# Patient Record
Sex: MALE | Race: WHITE | Employment: OTHER | URBAN - METROPOLITAN AREA
[De-identification: names, ages, dates, MRNs, and addresses within clinical notes are randomized per-mention and may not be internally consistent; named-entity substitution may affect disease eponyms.]

---

## 2017-04-13 RX ORDER — VILAZODONE HYDROCHLORIDE 20 MG/1
TABLET ORAL
Qty: 30 TABLET | Refills: 2 | Status: SHIPPED | OUTPATIENT
Start: 2017-04-13 | End: 2017-07-05 | Stop reason: ALTCHOICE

## 2017-05-16 ENCOUNTER — TELEPHONE (OUTPATIENT)
Dept: FAMILY MEDICINE CLINIC | Age: 47
End: 2017-05-16

## 2017-07-03 RX ORDER — METOPROLOL SUCCINATE 50 MG/1
TABLET, EXTENDED RELEASE ORAL
Qty: 30 TABLET | Refills: 5 | Status: SHIPPED | OUTPATIENT
Start: 2017-07-03 | End: 2018-01-11 | Stop reason: SDUPTHER

## 2017-07-05 ENCOUNTER — OFFICE VISIT (OUTPATIENT)
Dept: FAMILY MEDICINE CLINIC | Age: 47
End: 2017-07-05

## 2017-07-05 VITALS
OXYGEN SATURATION: 97 % | HEIGHT: 73 IN | DIASTOLIC BLOOD PRESSURE: 88 MMHG | WEIGHT: 315 LBS | BODY MASS INDEX: 41.75 KG/M2 | SYSTOLIC BLOOD PRESSURE: 138 MMHG | HEART RATE: 73 BPM

## 2017-07-05 DIAGNOSIS — R12 PYROSIS: ICD-10-CM

## 2017-07-05 DIAGNOSIS — K21.9 GASTROESOPHAGEAL REFLUX DISEASE, ESOPHAGITIS PRESENCE NOT SPECIFIED: Primary | ICD-10-CM

## 2017-07-05 DIAGNOSIS — E66.01 MORBID OBESITY WITH BMI OF 40.0-44.9, ADULT (HCC): ICD-10-CM

## 2017-07-05 PROCEDURE — 99214 OFFICE O/P EST MOD 30 MIN: CPT | Performed by: FAMILY MEDICINE

## 2017-07-05 RX ORDER — SUCRALFATE 1 G/1
1 TABLET ORAL 4 TIMES DAILY
Qty: 120 TABLET | Refills: 3 | Status: SHIPPED | OUTPATIENT
Start: 2017-07-05 | End: 2018-02-06 | Stop reason: SDUPTHER

## 2017-07-05 ASSESSMENT — PATIENT HEALTH QUESTIONNAIRE - PHQ9
2. FEELING DOWN, DEPRESSED OR HOPELESS: 0
1. LITTLE INTEREST OR PLEASURE IN DOING THINGS: 0
SUM OF ALL RESPONSES TO PHQ QUESTIONS 1-9: 0
SUM OF ALL RESPONSES TO PHQ9 QUESTIONS 1 & 2: 0

## 2017-07-05 ASSESSMENT — ENCOUNTER SYMPTOMS: ABDOMINAL PAIN: 1

## 2017-07-06 ASSESSMENT — ENCOUNTER SYMPTOMS
FLATUS: 0
CONSTIPATION: 0
NAUSEA: 0
HEMATOCHEZIA: 0
BELCHING: 0
DIARRHEA: 0
VOMITING: 0

## 2017-09-19 ENCOUNTER — OFFICE VISIT (OUTPATIENT)
Dept: FAMILY MEDICINE CLINIC | Age: 47
End: 2017-09-19

## 2017-09-19 VITALS
BODY MASS INDEX: 39.98 KG/M2 | TEMPERATURE: 98.1 F | WEIGHT: 303 LBS | HEART RATE: 73 BPM | OXYGEN SATURATION: 97 % | DIASTOLIC BLOOD PRESSURE: 80 MMHG | SYSTOLIC BLOOD PRESSURE: 122 MMHG

## 2017-09-19 DIAGNOSIS — J02.9 PHARYNGITIS, UNSPECIFIED ETIOLOGY: Primary | ICD-10-CM

## 2017-09-19 DIAGNOSIS — Z23 NEED FOR INFLUENZA VACCINATION: ICD-10-CM

## 2017-09-19 PROCEDURE — 90471 IMMUNIZATION ADMIN: CPT | Performed by: FAMILY MEDICINE

## 2017-09-19 PROCEDURE — 90688 IIV4 VACCINE SPLT 0.5 ML IM: CPT | Performed by: FAMILY MEDICINE

## 2017-09-19 PROCEDURE — 99214 OFFICE O/P EST MOD 30 MIN: CPT | Performed by: FAMILY MEDICINE

## 2017-09-19 RX ORDER — AMOXICILLIN 875 MG/1
875 TABLET, COATED ORAL 2 TIMES DAILY
Qty: 20 TABLET | Refills: 0 | Status: SHIPPED | OUTPATIENT
Start: 2017-09-19 | End: 2017-09-29

## 2017-09-19 ASSESSMENT — ENCOUNTER SYMPTOMS
SWOLLEN GLANDS: 1
ABDOMINAL PAIN: 0
COUGH: 1
VOMITING: 0
SORE THROAT: 1
CHANGE IN BOWEL HABIT: 0
NAUSEA: 0

## 2017-10-10 RX ORDER — ESOMEPRAZOLE MAGNESIUM 40 MG/1
CAPSULE, DELAYED RELEASE ORAL
Qty: 30 CAPSULE | Refills: 2 | Status: SHIPPED | OUTPATIENT
Start: 2017-10-10 | End: 2018-01-11 | Stop reason: SDUPTHER

## 2018-01-11 RX ORDER — METOPROLOL SUCCINATE 50 MG/1
TABLET, EXTENDED RELEASE ORAL
Qty: 30 TABLET | Refills: 0 | Status: SHIPPED | OUTPATIENT
Start: 2018-01-11 | End: 2018-02-06 | Stop reason: SDUPTHER

## 2018-01-11 RX ORDER — ESOMEPRAZOLE MAGNESIUM 40 MG/1
CAPSULE, DELAYED RELEASE ORAL
Qty: 30 CAPSULE | Refills: 0 | Status: SHIPPED | OUTPATIENT
Start: 2018-01-11 | End: 2018-02-06

## 2018-02-06 ENCOUNTER — OFFICE VISIT (OUTPATIENT)
Dept: FAMILY MEDICINE CLINIC | Age: 48
End: 2018-02-06

## 2018-02-06 VITALS
SYSTOLIC BLOOD PRESSURE: 140 MMHG | BODY MASS INDEX: 41.08 KG/M2 | HEART RATE: 72 BPM | HEIGHT: 73 IN | DIASTOLIC BLOOD PRESSURE: 92 MMHG | OXYGEN SATURATION: 97 % | WEIGHT: 310 LBS

## 2018-02-06 DIAGNOSIS — K21.9 GASTROESOPHAGEAL REFLUX DISEASE, ESOPHAGITIS PRESENCE NOT SPECIFIED: ICD-10-CM

## 2018-02-06 DIAGNOSIS — I10 ESSENTIAL HYPERTENSION: Primary | ICD-10-CM

## 2018-02-06 PROCEDURE — 99213 OFFICE O/P EST LOW 20 MIN: CPT | Performed by: FAMILY MEDICINE

## 2018-02-06 RX ORDER — SUCRALFATE 1 G/1
1 TABLET ORAL 4 TIMES DAILY
Qty: 120 TABLET | Refills: 11 | Status: SHIPPED | OUTPATIENT
Start: 2018-02-06 | End: 2022-09-16 | Stop reason: SDUPTHER

## 2018-02-06 RX ORDER — METOPROLOL SUCCINATE 100 MG/1
100 TABLET, EXTENDED RELEASE ORAL DAILY
Qty: 30 TABLET | Refills: 11 | Status: SHIPPED | OUTPATIENT
Start: 2018-02-06 | End: 2019-02-08 | Stop reason: SDUPTHER

## 2018-02-06 RX ORDER — PANTOPRAZOLE SODIUM 40 MG/1
40 TABLET, DELAYED RELEASE ORAL DAILY
Qty: 30 TABLET | Refills: 11 | Status: SHIPPED | OUTPATIENT
Start: 2018-02-06 | End: 2019-02-07 | Stop reason: SDUPTHER

## 2018-02-06 ASSESSMENT — ENCOUNTER SYMPTOMS
CONSTIPATION: 0
ANAL BLEEDING: 0
DIARRHEA: 0
CHEST TIGHTNESS: 0
EYE DISCHARGE: 0
ABDOMINAL PAIN: 0
SHORTNESS OF BREATH: 0
BLOOD IN STOOL: 0
COUGH: 0
ABDOMINAL DISTENTION: 0

## 2018-06-13 ENCOUNTER — OFFICE VISIT (OUTPATIENT)
Dept: FAMILY MEDICINE CLINIC | Age: 48
End: 2018-06-13

## 2018-06-13 VITALS
HEART RATE: 70 BPM | SYSTOLIC BLOOD PRESSURE: 138 MMHG | OXYGEN SATURATION: 97 % | WEIGHT: 301 LBS | BODY MASS INDEX: 39.71 KG/M2 | DIASTOLIC BLOOD PRESSURE: 84 MMHG

## 2018-06-13 DIAGNOSIS — K21.9 GASTROESOPHAGEAL REFLUX DISEASE, ESOPHAGITIS PRESENCE NOT SPECIFIED: ICD-10-CM

## 2018-06-13 DIAGNOSIS — E78.5 DYSLIPIDEMIA: ICD-10-CM

## 2018-06-13 DIAGNOSIS — I10 ESSENTIAL HYPERTENSION: Primary | ICD-10-CM

## 2018-06-13 DIAGNOSIS — N64.4 NIPPLE PAIN: ICD-10-CM

## 2018-06-13 LAB
A/G RATIO: 1.5 (ref 1.1–2.2)
ALBUMIN SERPL-MCNC: 4.3 G/DL (ref 3.4–5)
ALP BLD-CCNC: 102 U/L (ref 40–129)
ALT SERPL-CCNC: 29 U/L (ref 10–40)
ANION GAP SERPL CALCULATED.3IONS-SCNC: 16 MMOL/L (ref 3–16)
AST SERPL-CCNC: 23 U/L (ref 15–37)
BILIRUB SERPL-MCNC: 0.7 MG/DL (ref 0–1)
BUN BLDV-MCNC: 12 MG/DL (ref 7–20)
CALCIUM SERPL-MCNC: 9.3 MG/DL (ref 8.3–10.6)
CHLORIDE BLD-SCNC: 102 MMOL/L (ref 99–110)
CHOLESTEROL, TOTAL: 212 MG/DL (ref 0–199)
CO2: 23 MMOL/L (ref 21–32)
CREAT SERPL-MCNC: 1.1 MG/DL (ref 0.9–1.3)
GFR AFRICAN AMERICAN: >60
GFR NON-AFRICAN AMERICAN: >60
GLOBULIN: 2.9 G/DL
GLUCOSE BLD-MCNC: 104 MG/DL (ref 70–99)
HDLC SERPL-MCNC: 28 MG/DL (ref 40–60)
LDL CHOLESTEROL CALCULATED: 144 MG/DL
POTASSIUM SERPL-SCNC: 4.5 MMOL/L (ref 3.5–5.1)
SODIUM BLD-SCNC: 141 MMOL/L (ref 136–145)
TOTAL PROTEIN: 7.2 G/DL (ref 6.4–8.2)
TRIGL SERPL-MCNC: 199 MG/DL (ref 0–150)
VLDLC SERPL CALC-MCNC: 40 MG/DL

## 2018-06-13 PROCEDURE — 99214 OFFICE O/P EST MOD 30 MIN: CPT | Performed by: FAMILY MEDICINE

## 2018-06-13 PROCEDURE — 36415 COLL VENOUS BLD VENIPUNCTURE: CPT | Performed by: FAMILY MEDICINE

## 2018-06-13 ASSESSMENT — ENCOUNTER SYMPTOMS
ABDOMINAL PAIN: 0
SHORTNESS OF BREATH: 0

## 2018-06-19 ENCOUNTER — HOSPITAL ENCOUNTER (OUTPATIENT)
Dept: MAMMOGRAPHY | Age: 48
Discharge: HOME | End: 2018-06-19
Admitting: FAMILY MEDICINE

## 2018-06-19 DIAGNOSIS — N62 GYNECOMASTIA, MALE: Primary | ICD-10-CM

## 2018-06-19 DIAGNOSIS — N64.4 MASTODYNIA: ICD-10-CM

## 2018-06-22 ENCOUNTER — NURSE ONLY (OUTPATIENT)
Dept: FAMILY MEDICINE CLINIC | Age: 48
End: 2018-06-22

## 2018-06-22 DIAGNOSIS — N62 GYNECOMASTIA, MALE: ICD-10-CM

## 2018-06-22 DIAGNOSIS — N62 GYNECOMASTIA, MALE: Primary | ICD-10-CM

## 2018-06-22 LAB
ESTRADIOL LEVEL: 30 PG/ML
LUTEINIZING HORMONE: 5.9 MIU/ML
TSH REFLEX: 2.44 UIU/ML (ref 0.27–4.2)

## 2018-06-22 PROCEDURE — 36415 COLL VENOUS BLD VENIPUNCTURE: CPT | Performed by: FAMILY MEDICINE

## 2018-06-24 LAB — HCG TUMOR MARKER: <1 IU/L (ref 0–3)

## 2018-06-26 LAB
SEX HORMONE BINDING GLOBULIN: 38 NMOL/L (ref 11–80)
TESTOSTERONE FREE-NONMALE: 64.4 PG/ML (ref 47–244)
TESTOSTERONE TOTAL: 351 NG/DL (ref 220–1000)

## 2018-08-03 ENCOUNTER — OFFICE VISIT (OUTPATIENT)
Dept: FAMILY MEDICINE CLINIC | Age: 48
End: 2018-08-03

## 2018-08-03 VITALS
DIASTOLIC BLOOD PRESSURE: 72 MMHG | SYSTOLIC BLOOD PRESSURE: 128 MMHG | WEIGHT: 300.4 LBS | HEIGHT: 73 IN | BODY MASS INDEX: 39.81 KG/M2 | OXYGEN SATURATION: 97 % | HEART RATE: 98 BPM

## 2018-08-03 DIAGNOSIS — F41.9 ANXIETY: Primary | ICD-10-CM

## 2018-08-03 PROCEDURE — 99213 OFFICE O/P EST LOW 20 MIN: CPT | Performed by: PHYSICIAN ASSISTANT

## 2018-08-03 RX ORDER — HYDROXYZINE PAMOATE 50 MG/1
50-100 CAPSULE ORAL 4 TIMES DAILY PRN
Qty: 20 CAPSULE | Refills: 0 | Status: SHIPPED | OUTPATIENT
Start: 2018-08-03 | End: 2018-08-06 | Stop reason: SDUPTHER

## 2018-08-03 ASSESSMENT — ENCOUNTER SYMPTOMS: RESPIRATORY NEGATIVE: 1

## 2018-08-03 NOTE — PROGRESS NOTES
Subjective:      Patient ID: Carmencita Shone is a 56 y.o. male. HPI  Patient presents today with anxiety concerns. He has had anxiety in the past but had been well controlled  but something happened a few days ago that patient will not discuss today that is overwhelming to him. Denies any SI. Review of Systems   Constitutional: Negative. Respiratory: Negative. Cardiovascular: Negative. Psychiatric/Behavioral: Positive for decreased concentration. Negative for sleep disturbance. The patient is nervous/anxious. Objective:   Physical Exam   Constitutional: He is oriented to person, place, and time. He appears well-developed and well-nourished. Neurological: He is alert and oriented to person, place, and time. Skin: Skin is warm and dry. Psychiatric: He has a normal mood and affect. Assessment:       Diagnosis Orders   1. Anxiety             Plan: Will rx a temporary supply of vistaril which he had been on in the past until he can follow up with his primary provider early next week.

## 2018-08-06 ENCOUNTER — TELEPHONE (OUTPATIENT)
Dept: ADMINISTRATIVE | Age: 48
End: 2018-08-06

## 2018-08-06 RX ORDER — HYDROXYZINE PAMOATE 50 MG/1
50-100 CAPSULE ORAL 4 TIMES DAILY PRN
Qty: 120 CAPSULE | Refills: 0 | Status: SHIPPED | OUTPATIENT
Start: 2018-08-06 | End: 2020-03-03

## 2018-08-16 ENCOUNTER — OFFICE VISIT (OUTPATIENT)
Dept: FAMILY MEDICINE CLINIC | Age: 48
End: 2018-08-16

## 2018-08-16 VITALS
OXYGEN SATURATION: 97 % | BODY MASS INDEX: 39.84 KG/M2 | WEIGHT: 302 LBS | HEART RATE: 60 BPM | DIASTOLIC BLOOD PRESSURE: 86 MMHG | SYSTOLIC BLOOD PRESSURE: 124 MMHG

## 2018-08-16 DIAGNOSIS — R06.83 SNORING: ICD-10-CM

## 2018-08-16 DIAGNOSIS — F41.9 ANXIETY: Primary | ICD-10-CM

## 2018-08-16 PROCEDURE — 99214 OFFICE O/P EST MOD 30 MIN: CPT | Performed by: FAMILY MEDICINE

## 2018-08-16 RX ORDER — PAROXETINE 10 MG/1
10 TABLET, FILM COATED ORAL DAILY
Qty: 30 TABLET | Refills: 3 | Status: SHIPPED | OUTPATIENT
Start: 2018-08-16 | End: 2018-09-13

## 2018-08-16 ASSESSMENT — ENCOUNTER SYMPTOMS: SHORTNESS OF BREATH: 0

## 2018-08-16 NOTE — PATIENT INSTRUCTIONS
(sometimes called L-tryptophan), warfarin (Coumadin, Jantoven);  · heart rhythm medicine;  · HIV or AIDS medications;  · certain medicines to treat narcolepsy or ADHD --amphetamine, atomoxetine, dextroamphetamine, Adderall, Dexedrine, Evekeo, Vyvanse, and others;  · narcotic pain medicine --fentanyl, tramadol;  · medicine to treat anxiety, mood disorders, thought disorders, or mental illness --such as buspirone, lithium, other antidepressants, or antipsychotics;  · migraine headache medicine --sumatriptan, rizatriptan, zolmitriptan, and others; or  · seizure medicine --phenobarbital, phenytoin. This list is not complete. Other drugs may interact with paroxetine, including prescription and over-the-counter medicines, vitamins, and herbal products. Not all possible interactions are listed in this medication guide. Where can I get more information? Your pharmacist can provide more information about paroxetine. Remember, keep this and all other medicines out of the reach of children, never share your medicines with others, and use this medication only for the indication prescribed. Every effort has been made to ensure that the information provided by [facility]  is accurate, up-to-date, and complete, but no guarantee is made to that effect. Drug information contained herein may be time sensitive. [facility] information has been compiled for use by healthcare practitioners and consumers in the [place] and therefore [employer] does not warrant that uses outside of the [place] are appropriate, unless specifically indicated otherwise. [facility]'s drug information does not endorse drugs, diagnose patients or recommend therapy.  [facility] drug information is an informational resource designed to assist licensed healthcare practitioners in caring for their patients and/or to serve consumers viewing this service as a supplement to, and not a substitute for, the expertise, skill, knowledge and judgment of healthcare practitioners. The absence of a warning for a given drug or drug combination in no way should be construed to indicate that the drug or drug combination is safe, effective or appropriate for any given patient. [facility] does not assume any responsibility for any aspect of healthcare administered with the aid of information [facility] provides. The information contained herein is not intended to cover all possible uses, directions, precautions, warnings, drug interactions, allergic reactions, or adverse effects. If you have questions about the drugs you are taking, check with your doctor, nurse or pharmacist.  Copyright 3656-9702 [address]: 26.01. Revision date: 5/8/2017. Care instructions adapted under license by [facility] ([facility]). If you have questions about a medical condition or this instruction, always ask your healthcare professional. [place] [address] any warranty or liability for your use of this information.

## 2018-08-16 NOTE — PROGRESS NOTES
no wheezes. Neurological: He is alert and oriented to person, place, and time. Skin: Skin is warm and dry. He is not diaphoretic. Vitals reviewed. ASSESSMENT:   Well Adult, See encounter diagnoses  Earl Kearney was seen today for medication check and sleep apnea. Diagnoses and all orders for this visit:    Anxiety  Will start paxil and see if it better controls his overall mood. Can still take vistaril prn if needed. Medication handout provided notify me of any SE.  -     PARoxetine (PAXIL) 10 MG tablet; Take 1 tablet by mouth daily    Snoring  -     [address]:   See orders and medications filed with this encounter. Return in about 4 weeks (around 9/13/2018) for for follow up of anxiety.

## 2018-08-17 ENCOUNTER — TELEPHONE (OUTPATIENT)
Dept: FAMILY MEDICINE CLINIC | Age: 48
End: 2018-08-17

## 2018-08-21 ENCOUNTER — OFFICE VISIT (OUTPATIENT)
Dept: PULMONOLOGY | Age: 48
End: 2018-08-21

## 2018-08-21 VITALS
SYSTOLIC BLOOD PRESSURE: 131 MMHG | BODY MASS INDEX: 39.63 KG/M2 | RESPIRATION RATE: 16 BRPM | HEART RATE: 52 BPM | OXYGEN SATURATION: 97 % | HEIGHT: 73 IN | DIASTOLIC BLOOD PRESSURE: 81 MMHG | WEIGHT: 299 LBS | TEMPERATURE: 97 F

## 2018-08-21 DIAGNOSIS — R06.83 SNORING: Primary | ICD-10-CM

## 2018-08-21 DIAGNOSIS — E66.9 OBESITY (BMI 30-39.9): ICD-10-CM

## 2018-08-21 DIAGNOSIS — I10 ESSENTIAL HYPERTENSION: ICD-10-CM

## 2018-08-21 DIAGNOSIS — G47.30 OBSERVED SLEEP APNEA: ICD-10-CM

## 2018-08-21 PROCEDURE — 99244 OFF/OP CNSLTJ NEW/EST MOD 40: CPT | Performed by: NURSE PRACTITIONER

## 2018-08-21 ASSESSMENT — SLEEP AND FATIGUE QUESTIONNAIRES
NECK CIRCUMFERENCE (INCHES): 19.5
HOW LIKELY ARE YOU TO NOD OFF OR FALL ASLEEP WHILE SITTING AND TALKING TO SOMEONE: 0
HOW LIKELY ARE YOU TO NOD OFF OR FALL ASLEEP WHILE SITTING AND READING: 1
HOW LIKELY ARE YOU TO NOD OFF OR FALL ASLEEP IN A CAR, WHILE STOPPED FOR A FEW MINUTES IN TRAFFIC: 0
ESS TOTAL SCORE: 5
HOW LIKELY ARE YOU TO NOD OFF OR FALL ASLEEP WHILE LYING DOWN TO REST IN THE AFTERNOON WHEN CIRCUMSTANCES PERMIT: 3
HOW LIKELY ARE YOU TO NOD OFF OR FALL ASLEEP WHEN YOU ARE A PASSENGER IN A CAR FOR AN HOUR WITHOUT A BREAK: 0
HOW LIKELY ARE YOU TO NOD OFF OR FALL ASLEEP WHILE WATCHING TV: 1
HOW LIKELY ARE YOU TO NOD OFF OR FALL ASLEEP WHILE SITTING INACTIVE IN A PUBLIC PLACE: 0
HOW LIKELY ARE YOU TO NOD OFF OR FALL ASLEEP WHILE SITTING QUIETLY AFTER LUNCH WITHOUT ALCOHOL: 0

## 2018-08-21 NOTE — PROGRESS NOTES
Patient ID: Aleksandr Bernabe is a 56 y.o. male who is being seen today for   Chief Complaint   Patient presents with   Hyun Horton Patient     R/B Dr Marco Hernandez,  apnea low sat         HPI:   Aleksandr Bernabe is a 56 y.o. male in office for BERNADINE/snoring evaluation. States he had a colonoscopy and was told he was hypoxic. Patient reports snoring at night for the past 20+ years. Has witnessed apnea. Has woke up at night choking and gasping for air, states has reflux. Sometimes restorative sleep. No dry mouth upon awakening. Fatigue and tiredness during the day. No history of sleep apnea. Bedtime 8 pm-4 am and rise time is 10 am- noon. It takes few minutes to fall asleep. 0-1 nocturia. Wakes up 0-5 times at night. It takes few minutes to fall back a sleep. 1 nap during the day few time a week (120 minutes). No headache in am. No car wrecks or near wrecks because of the sleepiness. No nodding off while driving. No weight gain. No forgetfulness or decreased concentration. No nasal congestion at night. Drinks 3--4 caffinated beverages per day. No significant alcohol. No restless feelings in legs at night. No teeth grinding. No nightmares. No sleep walking. No night time panic attacks. No narcotics. No drug abuse. No history of depression. +history of anxiety. No history of atrial fibrillation. No history of DM. +history of HTN. No history of ischemic heart disease. No history of stroke. ESS is 5. No smoking. No FH for BERNADINE, RLS or narcolepsy.      Sleep Medicine 8/21/2018   Sitting and reading 1   Watching TV 1   Sitting, inactive in a public place (e.g. a theatre or a meeting) 0   As a passenger in a car for an hour without a break 0   Lying down to rest in the afternoon when circumstances permit 3   Sitting and talking to someone 0   Sitting quietly after a lunch without alcohol 0   In a car, while stopped for a few minutes in traffic 0   Total score 5   Neck circumference 19.5       Past Medical History:  Past Medical History: rash  Neurological: Negative for syncope  Hematological: Negative for adenopathy  Psychiatric/Behavorial: Negative for anxiety      Objective:   PHYSICAL EXAM:    /81 (Site: Left Arm, Position: Sitting)   Pulse 52   Temp 97 °F (36.1 °C) (Oral)   Resp 16   Ht 6' 1\" (1.854 m)   Wt 299 lb (135.6 kg)   SpO2 97%   BMI 39.45 kg/m²     Physical Exam  Gen: No acute distress. Obese. BMI of 39.45  Eyes: PERRL. No sclera icterus. No conjunctival injection. ENT: No discharge. Pharynx clear. Mallampati class IV. Neck: Trachea midline. No obvious mass. Neck circumference 19.5\"  Resp: No accessory muscle use. No crackles. No wheezes. No rhonchi. CV: Regular rate. Regular rhythm. No murmur or rub. No LE edema. GI: Non-tender. Non-distended. Skin: Warm and dry. No nodule on exposed extremities. Lymph: No cervical LAD. No supraclavicular LAD. M/S: No cyanosis. No obvious joint deformity. Neuro: Awake. Alert. Moves all four extremities. Psych: Oriented x 3. No anxiety. DATA:       Assessment:       · Snoring  · Observed sleep apnea   · Obesity  · HTN, GERD, anxiety per PCP        Plan:       · HST to evaluate for sleep related breathing disorder. · Treatment options were discussed with patient if PSG reveals BERNADINE, including CPAP/APAP therapy, oral appliances and upper airway surgery. · Sleep hygiene  · Avoid sedatives, alcohol and caffeinated drinks at bed time. · No driving motorized vehicles or operating heavy machinery while fatigue, drowsy or sleepy. · Weight loss is also recommended as a long-term intervention. · Complications of BERNADINE if not treated were discussed with patient patient to include systemic hypertension, pulmonary hypertension, cardiovascular morbidities, car accidents and all cause mortality. Discussed pathophysiology of BERNADINE with patient today- video shown in office.   Patient education handout provided regarding sleep tips and CPAP cleaning recommendations

## 2018-09-13 ENCOUNTER — OFFICE VISIT (OUTPATIENT)
Dept: FAMILY MEDICINE CLINIC | Age: 48
End: 2018-09-13

## 2018-09-13 VITALS
WEIGHT: 296.4 LBS | HEIGHT: 73 IN | DIASTOLIC BLOOD PRESSURE: 82 MMHG | HEART RATE: 65 BPM | BODY MASS INDEX: 39.28 KG/M2 | SYSTOLIC BLOOD PRESSURE: 120 MMHG | OXYGEN SATURATION: 97 %

## 2018-09-13 DIAGNOSIS — Z13.1 DIABETES MELLITUS SCREENING: ICD-10-CM

## 2018-09-13 DIAGNOSIS — Z23 NEED FOR INFLUENZA VACCINATION: ICD-10-CM

## 2018-09-13 DIAGNOSIS — F41.9 ANXIETY: Primary | ICD-10-CM

## 2018-09-13 PROCEDURE — 99213 OFFICE O/P EST LOW 20 MIN: CPT | Performed by: FAMILY MEDICINE

## 2018-09-13 PROCEDURE — 90471 IMMUNIZATION ADMIN: CPT | Performed by: FAMILY MEDICINE

## 2018-09-13 PROCEDURE — 36415 COLL VENOUS BLD VENIPUNCTURE: CPT | Performed by: FAMILY MEDICINE

## 2018-09-13 PROCEDURE — 90688 IIV4 VACCINE SPLT 0.5 ML IM: CPT | Performed by: FAMILY MEDICINE

## 2018-09-13 ASSESSMENT — PATIENT HEALTH QUESTIONNAIRE - PHQ9
SUM OF ALL RESPONSES TO PHQ QUESTIONS 1-9: 0
SUM OF ALL RESPONSES TO PHQ9 QUESTIONS 1 & 2: 0
2. FEELING DOWN, DEPRESSED OR HOPELESS: 0
SUM OF ALL RESPONSES TO PHQ QUESTIONS 1-9: 0
1. LITTLE INTEREST OR PLEASURE IN DOING THINGS: 0

## 2018-09-14 LAB
ESTIMATED AVERAGE GLUCOSE: 116.9 MG/DL
HBA1C MFR BLD: 5.7 %

## 2018-09-18 ENCOUNTER — OFFICE VISIT (OUTPATIENT)
Dept: DERMATOLOGY | Age: 48
End: 2018-09-18

## 2018-09-18 DIAGNOSIS — D22.9 MULTIPLE NEVI: Primary | ICD-10-CM

## 2018-09-18 DIAGNOSIS — L91.8 INFLAMED SKIN TAG: ICD-10-CM

## 2018-09-18 DIAGNOSIS — L82.1 SEBORRHEIC KERATOSIS: ICD-10-CM

## 2018-09-18 PROCEDURE — 99202 OFFICE O/P NEW SF 15 MIN: CPT | Performed by: DERMATOLOGY

## 2018-09-18 PROCEDURE — 11200 RMVL SKIN TAGS UP TO&INC 15: CPT | Performed by: DERMATOLOGY
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
## 2018-09-19 ENCOUNTER — HOSPITAL ENCOUNTER (OUTPATIENT)
Dept: SLEEP CENTER | Age: 48
Discharge: HOME | End: 2018-09-21
Payer: COMMERCIAL

## 2018-09-19 DIAGNOSIS — G47.30 OBSERVED SLEEP APNEA: ICD-10-CM

## 2018-09-19 DIAGNOSIS — I10 ESSENTIAL HYPERTENSION: ICD-10-CM

## 2018-09-19 DIAGNOSIS — R06.83 SNORING: ICD-10-CM

## 2018-09-19 PROCEDURE — 95806 SLEEP STUDY UNATT&RESP EFFT: CPT

## 2018-09-25 ENCOUNTER — TELEPHONE (OUTPATIENT)
Dept: PULMONOLOGY | Age: 48
End: 2018-09-25

## 2018-09-25 DIAGNOSIS — G47.33 OSA (OBSTRUCTIVE SLEEP APNEA): Primary | ICD-10-CM

## 2018-10-04 DIAGNOSIS — G47.33 OSA (OBSTRUCTIVE SLEEP APNEA): Primary | ICD-10-CM

## 2018-10-26 ENCOUNTER — HOSPITAL ENCOUNTER (OUTPATIENT)
Dept: SLEEP CENTER | Age: 48
Discharge: HOME | End: 2018-10-28
Payer: COMMERCIAL

## 2018-10-26 DIAGNOSIS — G47.33 OSA (OBSTRUCTIVE SLEEP APNEA): ICD-10-CM

## 2018-10-26 PROCEDURE — 95811 POLYSOM 6/>YRS CPAP 4/> PARM: CPT

## 2018-11-02 DIAGNOSIS — G47.33 OSA (OBSTRUCTIVE SLEEP APNEA): Primary | ICD-10-CM

## 2018-12-06 ENCOUNTER — OFFICE VISIT (OUTPATIENT)
Dept: FAMILY MEDICINE CLINIC | Age: 48
End: 2018-12-06
Payer: COMMERCIAL

## 2018-12-06 VITALS
BODY MASS INDEX: 40.5 KG/M2 | DIASTOLIC BLOOD PRESSURE: 70 MMHG | HEART RATE: 63 BPM | WEIGHT: 307 LBS | OXYGEN SATURATION: 98 % | SYSTOLIC BLOOD PRESSURE: 112 MMHG

## 2018-12-06 DIAGNOSIS — I10 ESSENTIAL HYPERTENSION: ICD-10-CM

## 2018-12-06 DIAGNOSIS — F32.A DEPRESSION, UNSPECIFIED DEPRESSION TYPE: Primary | ICD-10-CM

## 2018-12-06 PROCEDURE — 99213 OFFICE O/P EST LOW 20 MIN: CPT | Performed by: FAMILY MEDICINE

## 2018-12-06 ASSESSMENT — ENCOUNTER SYMPTOMS: SHORTNESS OF BREATH: 0
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
## 2018-12-20 ENCOUNTER — TELEPHONE (OUTPATIENT)
Dept: PULMONOLOGY | Age: 48
End: 2018-12-20

## 2018-12-20 DIAGNOSIS — G47.33 OSA (OBSTRUCTIVE SLEEP APNEA): Primary | ICD-10-CM

## 2018-12-20 NOTE — TELEPHONE ENCOUNTER
BiPAP compliance report from 11/19/18-12/11/18 on BiPAP 15/10 cm H2O reviewed. Compliance is good 96%. AHI is much improved but very slightly elevated 5.6. Report also showing leak 28.2 L/m.   Please send order for mask fitting at [facility] as mask leak could be contributing to elevated AHI

## 2019-01-29 ENCOUNTER — OFFICE VISIT (OUTPATIENT)
Dept: PULMONOLOGY | Age: 49
End: 2019-01-29
Payer: COMMERCIAL

## 2019-01-29 VITALS
OXYGEN SATURATION: 97 % | WEIGHT: 315 LBS | TEMPERATURE: 97 F | RESPIRATION RATE: 16 BRPM | HEIGHT: 73 IN | DIASTOLIC BLOOD PRESSURE: 67 MMHG | HEART RATE: 51 BPM | BODY MASS INDEX: 41.75 KG/M2 | SYSTOLIC BLOOD PRESSURE: 121 MMHG

## 2019-01-29 DIAGNOSIS — G47.33 SEVERE OBSTRUCTIVE SLEEP APNEA: Primary | ICD-10-CM

## 2019-01-29 DIAGNOSIS — Z71.89 ENCOUNTER FOR BIPAP USE COUNSELING: ICD-10-CM

## 2019-01-29 DIAGNOSIS — E66.01 OBESITY, MORBID, BMI 40.0-49.9 (HCC): ICD-10-CM

## 2019-01-29 DIAGNOSIS — I10 ESSENTIAL HYPERTENSION: ICD-10-CM

## 2019-01-29 PROCEDURE — 99213 OFFICE O/P EST LOW 20 MIN: CPT | Performed by: NURSE PRACTITIONER

## 2019-01-29 ASSESSMENT — SLEEP AND FATIGUE QUESTIONNAIRES
HOW LIKELY ARE YOU TO NOD OFF OR FALL ASLEEP WHILE SITTING AND TALKING TO SOMEONE: 0
ESS TOTAL SCORE: 0
HOW LIKELY ARE YOU TO NOD OFF OR FALL ASLEEP WHILE WATCHING TV: 0
HOW LIKELY ARE YOU TO NOD OFF OR FALL ASLEEP WHEN YOU ARE A PASSENGER IN A CAR FOR AN HOUR WITHOUT A BREAK: 0
NECK CIRCUMFERENCE (INCHES): 19.5
HOW LIKELY ARE YOU TO NOD OFF OR FALL ASLEEP WHILE SITTING QUIETLY AFTER LUNCH WITHOUT ALCOHOL: 0
HOW LIKELY ARE YOU TO NOD OFF OR FALL ASLEEP WHILE SITTING AND READING: 0
HOW LIKELY ARE YOU TO NOD OFF OR FALL ASLEEP WHILE SITTING INACTIVE IN A PUBLIC PLACE: 0
HOW LIKELY ARE YOU TO NOD OFF OR FALL ASLEEP IN A CAR, WHILE STOPPED FOR A FEW MINUTES IN TRAFFIC: 0
HOW LIKELY ARE YOU TO NOD OFF OR FALL ASLEEP WHILE LYING DOWN TO REST IN THE AFTERNOON WHEN CIRCUMSTANCES PERMIT: 0

## 2019-02-07 RX ORDER — PANTOPRAZOLE SODIUM 40 MG/1
40 TABLET, DELAYED RELEASE ORAL DAILY
Qty: 30 TABLET | Refills: 11 | Status: SHIPPED | OUTPATIENT
Start: 2019-02-07 | End: 2020-03-03

## 2019-02-08 RX ORDER — METOPROLOL SUCCINATE 100 MG/1
100 TABLET, EXTENDED RELEASE ORAL DAILY
Qty: 30 TABLET | Refills: 11 | Status: SHIPPED | OUTPATIENT
Start: 2019-02-08 | End: 2020-03-03 | Stop reason: SDUPTHER

## 2019-07-01 NOTE — TELEPHONE ENCOUNTER
LOV: 12/6/18  F/U: Not scheduled       Plan:   See orders and medications filed with this encounter. Return in about 6 months (around 6/6/2019). for fasting labs

## 2020-01-28 ENCOUNTER — OFFICE VISIT (OUTPATIENT)
Dept: PULMONOLOGY | Age: 50
End: 2020-01-28
Payer: COMMERCIAL

## 2020-01-28 VITALS
HEIGHT: 73 IN | TEMPERATURE: 98.7 F | HEART RATE: 67 BPM | BODY MASS INDEX: 41.75 KG/M2 | DIASTOLIC BLOOD PRESSURE: 79 MMHG | OXYGEN SATURATION: 97 % | WEIGHT: 315 LBS | RESPIRATION RATE: 16 BRPM | SYSTOLIC BLOOD PRESSURE: 117 MMHG

## 2020-01-28 PROCEDURE — 99213 OFFICE O/P EST LOW 20 MIN: CPT | Performed by: NURSE PRACTITIONER

## 2020-01-28 PROCEDURE — G8484 FLU IMMUNIZE NO ADMIN: HCPCS | Performed by: NURSE PRACTITIONER

## 2020-01-28 PROCEDURE — G8427 DOCREV CUR MEDS BY ELIG CLIN: HCPCS | Performed by: NURSE PRACTITIONER

## 2020-01-28 PROCEDURE — G8417 CALC BMI ABV UP PARAM F/U: HCPCS | Performed by: NURSE PRACTITIONER

## 2020-01-28 PROCEDURE — 1036F TOBACCO NON-USER: CPT | Performed by: NURSE PRACTITIONER

## 2020-01-28 ASSESSMENT — SLEEP AND FATIGUE QUESTIONNAIRES
NECK CIRCUMFERENCE (INCHES): 19
HOW LIKELY ARE YOU TO NOD OFF OR FALL ASLEEP WHILE SITTING AND READING: 0
HOW LIKELY ARE YOU TO NOD OFF OR FALL ASLEEP WHILE SITTING QUIETLY AFTER LUNCH WITHOUT ALCOHOL: 0
HOW LIKELY ARE YOU TO NOD OFF OR FALL ASLEEP WHILE LYING DOWN TO REST IN THE AFTERNOON WHEN CIRCUMSTANCES PERMIT: 0
HOW LIKELY ARE YOU TO NOD OFF OR FALL ASLEEP WHILE WATCHING TV: 0
ESS TOTAL SCORE: 0
HOW LIKELY ARE YOU TO NOD OFF OR FALL ASLEEP IN A CAR, WHILE STOPPED FOR A FEW MINUTES IN TRAFFIC: 0
HOW LIKELY ARE YOU TO NOD OFF OR FALL ASLEEP WHILE SITTING AND TALKING TO SOMEONE: 0
HOW LIKELY ARE YOU TO NOD OFF OR FALL ASLEEP WHILE SITTING INACTIVE IN A PUBLIC PLACE: 0
HOW LIKELY ARE YOU TO NOD OFF OR FALL ASLEEP WHEN YOU ARE A PASSENGER IN A CAR FOR AN HOUR WITHOUT A BREAK: 0
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
## 2020-01-28 NOTE — PATIENT INSTRUCTIONS
Detail Level: Detailed your ability to fall asleep. Regular exercise is recommended to help you deepen the sleep. 3- Avoid heavy, spicy, or sugary foods 4-6 hours before bedtime: These can affect your ability to stay asleep. 4- Have a light snack before bed: Having an empty stomach can interfere with your sleep. Dairy products and turkey contain tryptophan, which acts as a natural sleep inducer. 5- Stay away from caffeine, nicotine and alcohol at least 4-6 hours before bed: Caffeine and nicotine are stimulants that interfere with your ability to fall asleep. While alcohol has an immediate sleep-inducing effect, a few hours later, as alcohol levels in your blood start to fall, there is a stimulant effect and you will experience fragmented sleep. 6- Take a hot bath 90 minutes before bedtime:  A hot bath will raise your body temperature, but it is the drop in body temperature that may leave you feeling sleepy  7- Develop sleep rituals: it is important to give your body cues that it is time to slow down and sleep. Listen to relaxing music, read something soothing for 15 minutes, have a cup of caffeine free tea, or do relaxation exercises such as yoga or deep breathing help relieve anxiety and reduce muscle tension. 8- Fix a bedtime and an awakening time: Even on weekends! When your sleep cycle has a regular rhythm, you will feel better. 9- Sleep only when sleepy: This reduces the time you are awake in bed. 10- Get into your favorite sleeping position: If you can't fall asleep within 15-30 minutes, get up and do something boring until you feel sleepy. Sit quietly in the dark or read the warranty on your refrigerator. Don't expose yourself to bright light while you are up, it gives cues to your brain that it is time to wake up. 11- Only use your bed for sleeping: Dont use the bed as an office, workroom or recreation room. Let your body \"know\" that the bed is associated with sleeping  12- Use comfortable bedding.  Uncomfortable Moisture in your unit can cause sudden pressure increases or short circuits  DO's and DON'Ts:  - Don't use alcohol-based products to clean your mask, because it can cause the materials to become hard and brittle. - Don't put headgear in the washer or dryer  - Don't use any caustic or household cleaning solutions such as bleach on your CPAP   equipment.  - Do follow the recommended cleaning schedule. - Do change your disposable filter frequently. Adapted From: [email or web address]. shtm.   These are general suggestions for all models please follow specific s recommendations and specific instructions Healed from previous biopsy. No evidence of reoccurrence

## 2020-03-03 ENCOUNTER — OFFICE VISIT (OUTPATIENT)
Dept: FAMILY MEDICINE CLINIC | Age: 50
End: 2020-03-03
Payer: COMMERCIAL

## 2020-03-03 VITALS
OXYGEN SATURATION: 98 % | HEART RATE: 69 BPM | HEIGHT: 73 IN | DIASTOLIC BLOOD PRESSURE: 70 MMHG | BODY MASS INDEX: 41.75 KG/M2 | SYSTOLIC BLOOD PRESSURE: 130 MMHG | WEIGHT: 315 LBS

## 2020-03-03 PROCEDURE — 36415 COLL VENOUS BLD VENIPUNCTURE: CPT | Performed by: FAMILY MEDICINE

## 2020-03-03 PROCEDURE — 99214 OFFICE O/P EST MOD 30 MIN: CPT | Performed by: FAMILY MEDICINE

## 2020-03-03 PROCEDURE — G8417 CALC BMI ABV UP PARAM F/U: HCPCS | Performed by: FAMILY MEDICINE

## 2020-03-03 PROCEDURE — 1036F TOBACCO NON-USER: CPT | Performed by: FAMILY MEDICINE

## 2020-03-03 PROCEDURE — 99396 PREV VISIT EST AGE 40-64: CPT | Performed by: FAMILY MEDICINE

## 2020-03-03 PROCEDURE — G8427 DOCREV CUR MEDS BY ELIG CLIN: HCPCS | Performed by: FAMILY MEDICINE

## 2020-03-03 PROCEDURE — G8484 FLU IMMUNIZE NO ADMIN: HCPCS | Performed by: FAMILY MEDICINE

## 2020-03-03 RX ORDER — METOPROLOL SUCCINATE 100 MG/1
100 TABLET, EXTENDED RELEASE ORAL DAILY
Qty: 90 TABLET | Refills: 1 | Status: SHIPPED | OUTPATIENT
Start: 2020-03-03 | End: 2020-07-06

## 2020-03-03 RX ORDER — SILDENAFIL CITRATE 20 MG/1
20 TABLET ORAL PRN
Qty: 30 TABLET | Refills: 3 | Status: SHIPPED | OUTPATIENT
Start: 2020-03-03 | End: 2020-03-04

## 2020-03-03 ASSESSMENT — PATIENT HEALTH QUESTIONNAIRE - PHQ9
SUM OF ALL RESPONSES TO PHQ9 QUESTIONS 1 & 2: 0
2. FEELING DOWN, DEPRESSED OR HOPELESS: 0
1. LITTLE INTEREST OR PLEASURE IN DOING THINGS: 0
SUM OF ALL RESPONSES TO PHQ QUESTIONS 1-9: 0
SUM OF ALL RESPONSES TO PHQ QUESTIONS 1-9: 0

## 2020-03-03 NOTE — PATIENT INSTRUCTIONS

## 2020-03-03 NOTE — PROGRESS NOTES
8/12/2013    polyp    HERNIA REPAIR      TONSILLECTOMY AND ADENOIDECTOMY  child    UPPER GASTROINTESTINAL ENDOSCOPY  8/12/2013    H/H    UPPER GASTROINTESTINAL ENDOSCOPY  11/29/2016     Most Recent Immunizations   Administered Date(s) Administered    Hepatitis A 02/21/2003    Hepatitis B 10/09/2008    Influenza Vaccine, unspecified formulation 11/20/2015    Influenza Virus Vaccine 09/19/2017    Influenza, Intradermal, Preservative free 10/13/2016    Influenza, Quadv, IM, (6 mo and older Fluzone, Flulaval, Fluarix and 3 yrs and older Afluria) 09/13/2018    Td, unspecified formulation 07/24/2001    Tdap (Boostrix, Adacel) 04/01/2011        Current Outpatient Medications   Medication Sig Dispense Refill    sildenafil (REVATIO) 20 MG tablet Take 3-4 tablets 1 hour prior to sexual activity max once per day. 30 tablet 3    metoprolol succinate (TOPROL XL) 100 MG extended release tablet Take 1 tablet by mouth daily 90 tablet 1    sucralfate (CARAFATE) 1 GM tablet Take 1 tablet by mouth 4 times daily 120 tablet 11    ibuprofen (ADVIL) 200 MG CAPS Take 1 capsule by mouth as needed for Fever       No current facility-administered medications for this visit.       Allergies   Allergen Reactions    Lisinopril      Extreme cramps       Social History     Socioeconomic History    Marital status:      Spouse name: None    Number of children: None    Years of education: None    Highest education level: None   Occupational History    None   Social Needs    Financial resource strain: None    Food insecurity:     Worry: None     Inability: None    Transportation needs:     Medical: None     Non-medical: None   Tobacco Use    Smoking status: Never Smoker    Smokeless tobacco: Never Used   Substance and Sexual Activity    Alcohol use: No     Alcohol/week: 0.0 standard drinks    Drug use: No    Sexual activity: Yes   Lifestyle    Physical activity:     Days per week: None     Minutes per session: None  Stress: None   Relationships    Social connections:     Talks on phone: None     Gets together: None     Attends Jew service: None     Active member of club or organization: None     Attends meetings of clubs or organizations: None     Relationship status: None    Intimate partner violence:     Fear of current or ex partner: None     Emotionally abused: None     Physically abused: None     Forced sexual activity: None   Other Topics Concern    None   Social History Narrative    None     Family History   Problem Relation Age of Onset    High Blood Pressure Maternal Grandmother                               Review Of Systems    Review of Systems   Constitutional: Negative for chills and fever. Respiratory: Negative for shortness of breath. Cardiovascular: Negative for chest pain. PHYSICAL EXAMINATION:    /70   Pulse 69   Ht 6' 1\" (1.854 m)   Wt (!) 320 lb 9.6 oz (145.4 kg)   SpO2 98%   BMI 42.30 kg/m²      Physical Exam  Vitals signs reviewed. Constitutional:       Appearance: Normal appearance. He is obese. HENT:      Head: Normocephalic and atraumatic. Eyes:      Extraocular Movements: Extraocular movements intact. Conjunctiva/sclera: Conjunctivae normal.   Cardiovascular:      Rate and Rhythm: Normal rate and regular rhythm. Heart sounds: Normal heart sounds. Pulmonary:      Effort: Pulmonary effort is normal.      Breath sounds: Normal breath sounds. Abdominal:      General: There is no distension. Palpations: Abdomen is soft. Tenderness: There is no abdominal tenderness. Skin:     General: Skin is warm and dry. Neurological:      General: No focal deficit present. Mental Status: He is alert and oriented to person, place, and time.            ASSESSMENT:   Well Adult, See encounter diagnoses  Jessica Toro was seen today for annual exam.    Diagnoses and all orders for this visit:    Annual physical exam  Provided handout regarding exercising for 30 minutes most days of the weeks. I feel that his abdominal symptoms of pressure is largely related to his high fat low fiber diet. Recommend that he works on eating out less and less fast food and eating more fruits and vegetables and supplementing his diet with Metamucil or Benefiber. Notify me if symptoms worsen or fail to improve. -     Hemoglobin A1C  -     Comprehensive Metabolic Panel  -     Lipid Panel    Erectile dysfunction, unspecified erectile dysfunction type  Side effect priapism discussed. Patient to notify me of any side effects. -     sildenafil (REVATIO) 20 MG tablet; Take 3-4 tablets 1 hour prior to sexual activity max once per day. Essential hypertension  -     metoprolol succinate (TOPROL XL) 100 MG extended release tablet; Take 1 tablet by mouth daily  -     Comprehensive Metabolic Panel      Patient stable without medications for anxiety he will continue to monitor and keep me posted how he is doing. Plan:   See orders and medications filed with this encounter. The patient is advised to return for routine annual checkups. Encouraged healthy diet, regular exercise and multivitamin daily. Labs checked per orders. Optho visit q 1-2 years. Watch for skin mole changes. Colonoscopy if over age 48 or if family history was discussed. PSAafter the age of 48 or with obstructive sx's which were reviewed today. DELFINA was not done today. Vaccines ordered today per orders. Return in about 3 months (around 6/3/2020), or if symptoms worsen or fail to improve.

## 2020-03-04 LAB
A/G RATIO: 1.5 (ref 1.1–2.2)
ALBUMIN SERPL-MCNC: 4.2 G/DL (ref 3.4–5)
ALP BLD-CCNC: 99 U/L (ref 40–129)
ALT SERPL-CCNC: 27 U/L (ref 10–40)
ANION GAP SERPL CALCULATED.3IONS-SCNC: 15 MMOL/L (ref 3–16)
AST SERPL-CCNC: 20 U/L (ref 15–37)
BILIRUB SERPL-MCNC: 0.6 MG/DL (ref 0–1)
BUN BLDV-MCNC: 10 MG/DL (ref 7–20)
CALCIUM SERPL-MCNC: 9 MG/DL (ref 8.3–10.6)
CHLORIDE BLD-SCNC: 103 MMOL/L (ref 99–110)
CHOLESTEROL, TOTAL: 193 MG/DL (ref 0–199)
CO2: 22 MMOL/L (ref 21–32)
CREAT SERPL-MCNC: 1 MG/DL (ref 0.9–1.3)
ESTIMATED AVERAGE GLUCOSE: 108.3 MG/DL
GFR AFRICAN AMERICAN: >60
GFR NON-AFRICAN AMERICAN: >60
GLOBULIN: 2.8 G/DL
GLUCOSE BLD-MCNC: 88 MG/DL (ref 70–99)
HBA1C MFR BLD: 5.4 %
HDLC SERPL-MCNC: 28 MG/DL (ref 40–60)
LDL CHOLESTEROL CALCULATED: 116 MG/DL
POTASSIUM SERPL-SCNC: 4.7 MMOL/L (ref 3.5–5.1)
SODIUM BLD-SCNC: 140 MMOL/L (ref 136–145)
TOTAL PROTEIN: 7 G/DL (ref 6.4–8.2)
TRIGL SERPL-MCNC: 246 MG/DL (ref 0–150)
VLDLC SERPL CALC-MCNC: 49 MG/DL

## 2020-03-04 RX ORDER — SILDENAFIL CITRATE 20 MG/1
TABLET ORAL
Qty: 30 TABLET | Refills: 3 | Status: SHIPPED | OUTPATIENT
Start: 2020-03-04 | End: 2021-04-13 | Stop reason: ALTCHOICE

## 2020-03-05 ASSESSMENT — ENCOUNTER SYMPTOMS: SHORTNESS OF BREATH: 0

## 2020-07-06 RX ORDER — METOPROLOL SUCCINATE 100 MG/1
100 TABLET, EXTENDED RELEASE ORAL DAILY
Qty: 90 TABLET | Refills: 1 | Status: SHIPPED | OUTPATIENT
Start: 2020-07-06 | End: 2020-07-22 | Stop reason: SDUPTHER

## 2020-07-22 RX ORDER — METOPROLOL SUCCINATE 100 MG/1
100 TABLET, EXTENDED RELEASE ORAL DAILY
Qty: 90 TABLET | Refills: 1 | Status: SHIPPED | OUTPATIENT
Start: 2020-07-22 | End: 2021-02-18 | Stop reason: SDUPTHER

## 2020-08-18 ENCOUNTER — VIRTUAL VISIT (OUTPATIENT)
Dept: FAMILY MEDICINE CLINIC | Age: 50
End: 2020-08-18
Payer: COMMERCIAL

## 2020-08-18 ENCOUNTER — NURSE TRIAGE (OUTPATIENT)
Dept: OTHER | Facility: CLINIC | Age: 50
End: 2020-08-18

## 2020-08-18 PROCEDURE — 99214 OFFICE O/P EST MOD 30 MIN: CPT | Performed by: FAMILY MEDICINE

## 2020-08-18 RX ORDER — OFLOXACIN 3 MG/ML
10 SOLUTION AURICULAR (OTIC) DAILY
Qty: 1 BOTTLE | Refills: 0 | Status: SHIPPED | OUTPATIENT
Start: 2020-08-18 | End: 2020-08-25

## 2020-08-18 NOTE — PROGRESS NOTES
2020    TELEHEALTH EVALUATION -- Audio/Visual (During QEFYW-69 public health emergency)    HPI:    Gilbert Lockhart (:  1970) has requested an audio/video evaluation for the following concern(s):    Dizziness   This is a new problem. The current episode started yesterday. The problem occurs daily. The problem has been waxing and waning. Associated symptoms include congestion. Pertinent negatives include no abdominal pain, anorexia, chills, fever, nausea, neck pain, sore throat, visual change or vomiting. Associated symptoms comments: Feels off balance with position changes, ear fullness. Exacerbated by: position change. He has tried nothing for the symptoms. Patient states that he recently went swimming he gets swimmer's ear often  after swimming. This is happened before and he is felt off balance. He had leftover eardrops from previous episode which he has been using but then they ran out. His ear was initially painful that but that has improved and then the dizziness started last night. He checks his blood pressure at home was 123/71 with a heart rate of 61. He is afebrile temperature 97.5. Review of Systems  As above  Prior to Visit Medications    Medication Sig Taking? Authorizing Provider   ofloxacin (FLOXIN) 0.3 % otic solution Place 10 drops into both ears daily for 7 days Yes Rick Carlos MD   metoprolol succinate (TOPROL XL) 100 MG extended release tablet Take 1 tablet by mouth daily Yes Rick Carlos MD   sildenafil (REVATIO) 20 MG tablet Take 3-4 tablets 1 hour prior to sexual activity max once per day.  Yes Rick Carlos MD   sucralfate (CARAFATE) 1 GM tablet Take 1 tablet by mouth 4 times daily Yes Trini Cervantes MD   ibuprofen (ADVIL) 200 MG CAPS Take 1 capsule by mouth as needed for Fever Yes Historical Provider, MD       Social History     Tobacco Use    Smoking status: Never Smoker    Smokeless tobacco: Never Used   Substance Use Topics    Alcohol use: improveNery Lloyd is a 51 y.o. male being evaluated by a Virtual Visit (video visit) encounter to address concerns as mentioned above. A caregiver was present when appropriate. Due to this being a TeleHealth encounter (During [facility]-09 public health emergency), evaluation of the following organ systems was limited: Vitals/Constitutional/EENT/Resp/CV/GI//MS/Neuro/Skin/Heme-Lymph-Imm. Pursuant to the emergency declaration under the [address] [place] [address] and the [employer] and [employer] Act, this Virtual Visit was conducted with patient's (and/or legal guardian's) consent, to reduce the patient's risk of exposure to COVID-19 and provide necessary medical care. The patient (and/or legal guardian) has also been advised to contact this office for worsening conditions or problems, and seek emergency medical treatment and/or call 911 if deemed necessary. Patient identification was verified at the start of the visit: Yes    Total time spent on this encounter: Not billed by time    Services were provided through a video synchronous discussion virtually to substitute for in-person clinic visit. Patient and provider were located at their individual homes. --Kyle Valdez MD on 8/18/2020 at 3:58 PM    An electronic signature was used to authenticate this note.

## 2020-08-18 NOTE — TELEPHONE ENCOUNTER
Reason for Disposition   Lightheadedness (dizziness) present now, after 2 hours of rest and fluids    Answer Assessment - Initial Assessment Questions  1. DESCRIPTION: \"Describe your dizziness. \"      Feels off balance when going to walk  2. LIGHTHEADED: \"Do you feel lightheaded? \" (e.g., somewhat faint, woozy, weak upon standing)    3. VERTIGO: \"Do you feel like either you or the room is spinning or tilting? \" (i.e. vertigo)   no spinning  4. SEVERITY: \"How bad is it? \"  \"Do you feel like you are going to faint? \" \"Can you stand and walk? \"    - MILD - walking normally    - MODERATE - interferes with normal activities (e.g., work, school)     - SEVERE - unable to stand, requires support to walk, feels like passing out now. 5. ONSET:  \"When did the dizziness begin? \"  Started feeling dizzy last night  6. AGGRAVATING FACTORS: \"Does anything make it worse? \" (e.g., standing, change in head position)  Bending over and position changes  7. HEART RATE: \"Can you tell me your heart rate? \" \"How many beats in 15 seconds? \"  (Note: not all patients can do this)      8. CAUSE: \"What do you think is causing the dizziness? \"    Ear feels clogged  9. RECURRENT SYMPTOM: \"Have you had dizziness before? \" If so, ask: \"When was the last time? \" \"What happened that time? \"  Has had vertigo in the past  10. OTHER SYMPTOMS: \"Do you have any other symptoms? \" (e.g., fever, chest pain, vomiting, diarrhea, bleeding)      Swimmers ear. No cold symptoms  11. PREGNANCY: \"Is there any chance you are pregnant? \" \"When was your last menstrual period? \"  na    Protocols used: [id number]    Received call from [facility]. Pt calling with feeling dizzy or off balance. Feels like he has something in his ear. Will feel off balance with position changes or moving head too fast. Gets swimmers ear easily and has been swimming a lot. No ear pain right now but feels like something in ear.  Recommend pt is seen today, call sooner if worsens. Call soft transferred to [facility] in [address] to schedule appointment. Please do not reply to the triage nurse through this encounter. Any subsequent communication should be directly with the patient. None Done

## 2020-09-14 ENCOUNTER — TELEPHONE (OUTPATIENT)
Dept: FAMILY MEDICINE CLINIC | Age: 50
End: 2020-09-14

## 2020-09-14 NOTE — TELEPHONE ENCOUNTER
----- Message from Tan Garnica sent at 9/14/2020 12:54 PM EDT -----  Subject: Message to Provider    QUESTIONS  Information for Provider? pt has a couple of questions about Julianna Downs he   knows she is leaving the practice   he would like to know details if she is going to another practice or is   someone taking her place he stated he needs a pcp and would like to follow      ---------------------------------------------------------------------------  --------------  [address]  What is the best way for the office to contact you? OK to leave message on   voicemail  Preferred Call Back Phone Number? [phone]  ---------------------------------------------------------------------------  --------------  SCRIPT ANSWERS  Relationship to Patient?  Self

## 2020-09-14 NOTE — TELEPHONE ENCOUNTER
Let him know that I am joining:   [address] [facility], [address], [address] [facility]  [phone]

## 2021-01-25 ENCOUNTER — TELEPHONE (OUTPATIENT)
Dept: PULMONOLOGY | Age: 51
End: 2021-01-25

## 2021-01-25 NOTE — TELEPHONE ENCOUNTER
Patient cancelled appointment on 2/2/21 with Tarah Rod for 1 year sleep. Reason: Pt declined a VV and said that he wanted to cancel appt and will call back when he is ready to r/s    Patient did not reschedule appointment. Appointment rescheduled for n/a. Last OV 1/28/20  Assessment:       · Severe BERNADINE. BIPAP 15/10 cm H2O Optimal compliance and efficacy on review today. · Snoring- resolved on BIPAP  · Observed sleep apnea -resolved on BIPAP  · Obesity  · HTN, GERD, anxiety per PCP         Plan:       · Continue BIPAP 15/10 cm H2O   · Advised to use BiPAP 6-8 hrs at night and during naps. · Replacement of mask, tubing, head straps every 3-6 months or sooner if damaged. · Patient instructed to contact [facility] company for any mask, tubing or machine trouble shooting if problems arise. · Sleep hygiene  · Avoid sedatives, alcohol and caffeinated drinks at bed time. · No driving motorized vehicles or operating heavy machinery while fatigue, drowsy or sleepy. · Weight loss is also recommended as a long-term intervention. · Complications of BERNADINE if not treated were discussed with patient patient to include systemic hypertension, pulmonary hypertension, cardiovascular morbidities, car accidents and all cause mortality.   · Patient education handout provided regarding sleep tips and PAP cleaning recommendations      Follow up 1 year, sooner if needed

## 2021-02-18 ENCOUNTER — TELEPHONE (OUTPATIENT)
Dept: FAMILY MEDICINE CLINIC | Age: 51
End: 2021-02-18

## 2021-02-18 DIAGNOSIS — I10 ESSENTIAL HYPERTENSION: ICD-10-CM

## 2021-02-18 RX ORDER — METOPROLOL SUCCINATE 100 MG/1
100 TABLET, EXTENDED RELEASE ORAL DAILY
Qty: 90 TABLET | Refills: 1 | Status: SHIPPED | OUTPATIENT
Start: 2021-02-18 | End: 2021-07-26

## 2021-02-18 NOTE — TELEPHONE ENCOUNTER
I sent the refill already on the Metoprolol. He can do a virtual visit to establish care to discuss his health.

## 2021-02-18 NOTE — TELEPHONE ENCOUNTER
Fax from pharmacy requesting refill on    Toprol XL tab 100  Mg  [id number]    Last seen:  8.18.20 Harvinder  No future scheduled

## 2021-02-18 NOTE — TELEPHONE ENCOUNTER
Tried to scheduled a NTP appointment w/patient. Patient stated he has not been out of house due to covid and did not want to make an appointment at this time.   PAT

## 2021-03-25 ENCOUNTER — IMMUNIZATION (OUTPATIENT)
Dept: PRIMARY CARE CLINIC | Age: 51
End: 2021-03-25
Payer: COMMERCIAL

## 2021-03-25 PROCEDURE — 0011A PR IMM ADMN SARSCOV2 100 MCG/0.5 ML 1ST DOSE: CPT | Performed by: FAMILY MEDICINE

## 2021-03-25 PROCEDURE — 91301 COVID-19, MODERNA VACCINE 100MCG/0.5ML DOSE: CPT | Performed by: FAMILY MEDICINE

## 2021-04-13 ENCOUNTER — VIRTUAL VISIT (OUTPATIENT)
Dept: FAMILY MEDICINE CLINIC | Age: 51
End: 2021-04-13
Payer: COMMERCIAL

## 2021-04-13 DIAGNOSIS — G47.33 SEVERE OBSTRUCTIVE SLEEP APNEA: ICD-10-CM

## 2021-04-13 DIAGNOSIS — I10 ESSENTIAL HYPERTENSION: Primary | ICD-10-CM

## 2021-04-13 DIAGNOSIS — K21.9 GASTROESOPHAGEAL REFLUX DISEASE, UNSPECIFIED WHETHER ESOPHAGITIS PRESENT: ICD-10-CM

## 2021-04-13 PROCEDURE — 99213 OFFICE O/P EST LOW 20 MIN: CPT | Performed by: NURSE PRACTITIONER

## 2021-04-13 ASSESSMENT — PATIENT HEALTH QUESTIONNAIRE - PHQ9
SUM OF ALL RESPONSES TO PHQ QUESTIONS 1-9: 0
2. FEELING DOWN, DEPRESSED OR HOPELESS: 0
SUM OF ALL RESPONSES TO PHQ QUESTIONS 1-9: 0
1. LITTLE INTEREST OR PLEASURE IN DOING THINGS: 0

## 2021-04-13 ASSESSMENT — ENCOUNTER SYMPTOMS
RESPIRATORY NEGATIVE: 1
GASTROINTESTINAL NEGATIVE: 1

## 2021-04-13 NOTE — PROGRESS NOTES
under the [facility] waiver authority and the [employer] and [employer] Act, this Virtual Visit was conducted with patient's (and/or legal guardian's) consent, to reduce the patient's risk of exposure to COVID-19 and provide necessary medical care. The patient (and/or legal guardian) has also been advised to contact this office for worsening conditions or problems, and seek emergency medical treatment and/or call 911 if deemed necessary. Services were provided through a video synchronous discussion virtually to substitute for in-person clinic visit. Patient and provider were located at their individual homes. --ALLISON Leach CNP on 4/13/2021 at 1:31 PM    An electronic signature was used to authenticate this note.

## 2021-04-22 ENCOUNTER — IMMUNIZATION (OUTPATIENT)
Dept: PRIMARY CARE CLINIC | Age: 51
End: 2021-04-22
Payer: COMMERCIAL

## 2021-04-22 PROCEDURE — 91301 COVID-19, MODERNA VACCINE 100MCG/0.5ML DOSE: CPT | Performed by: FAMILY MEDICINE

## 2021-04-22 PROCEDURE — 0012A PR IMM ADMN SARSCOV2 100 MCG/0.5 ML 2ND DOSE: CPT | Performed by: FAMILY MEDICINE

## 2021-07-24 DIAGNOSIS — I10 ESSENTIAL HYPERTENSION: ICD-10-CM

## 2021-07-26 RX ORDER — METOPROLOL SUCCINATE 100 MG/1
TABLET, EXTENDED RELEASE ORAL
Qty: 90 TABLET | Refills: 1 | Status: SHIPPED | OUTPATIENT
Start: 2021-07-26 | End: 2022-02-15 | Stop reason: SDUPTHER

## 2021-11-17 DIAGNOSIS — I10 ESSENTIAL HYPERTENSION: ICD-10-CM

## 2021-11-17 LAB
A/G RATIO: 1.4 (ref 1.1–2.2)
ALBUMIN SERPL-MCNC: 4 G/DL (ref 3.4–5)
ALP BLD-CCNC: 100 U/L (ref 40–129)
ALT SERPL-CCNC: 21 U/L (ref 10–40)
ANION GAP SERPL CALCULATED.3IONS-SCNC: 14 MMOL/L (ref 3–16)
AST SERPL-CCNC: 17 U/L (ref 15–37)
BILIRUB SERPL-MCNC: 0.5 MG/DL (ref 0–1)
BUN BLDV-MCNC: 12 MG/DL (ref 7–20)
CALCIUM SERPL-MCNC: 8.9 MG/DL (ref 8.3–10.6)
CHLORIDE BLD-SCNC: 106 MMOL/L (ref 99–110)
CHOLESTEROL, TOTAL: 185 MG/DL (ref 0–199)
CO2: 23 MMOL/L (ref 21–32)
CREAT SERPL-MCNC: 1.1 MG/DL (ref 0.9–1.3)
GFR AFRICAN AMERICAN: >60
GFR NON-AFRICAN AMERICAN: >60
GLUCOSE BLD-MCNC: 111 MG/DL (ref 70–99)
HDLC SERPL-MCNC: 26 MG/DL (ref 40–60)
LDL CHOLESTEROL CALCULATED: 124 MG/DL
POTASSIUM SERPL-SCNC: 4.4 MMOL/L (ref 3.5–5.1)
SODIUM BLD-SCNC: 143 MMOL/L (ref 136–145)
TOTAL PROTEIN: 6.9 G/DL (ref 6.4–8.2)
TRIGL SERPL-MCNC: 175 MG/DL (ref 0–150)
VLDLC SERPL CALC-MCNC: 35 MG/DL

## 2021-11-18 LAB
ESTIMATED AVERAGE GLUCOSE: 119.8 MG/DL
HBA1C MFR BLD: 5.8 %

## 2022-02-15 DIAGNOSIS — I10 ESSENTIAL HYPERTENSION: ICD-10-CM

## 2022-02-15 RX ORDER — METOPROLOL SUCCINATE 100 MG/1
TABLET, EXTENDED RELEASE ORAL
Qty: 90 TABLET | Refills: 0 | Status: SHIPPED | OUTPATIENT
Start: 2022-02-15 | End: 2022-05-23

## 2022-02-15 NOTE — TELEPHONE ENCOUNTER
Patient is requesting a refill on    metoprolol succinate (TOPROL XL) 100 MG extended release tablet    Send to [address] [facility] Visit  -  4.13.21 EG  Next Office Visit  -

## 2022-05-22 DIAGNOSIS — I10 ESSENTIAL HYPERTENSION: ICD-10-CM

## 2022-05-23 RX ORDER — METOPROLOL SUCCINATE 100 MG/1
TABLET, EXTENDED RELEASE ORAL
Qty: 90 TABLET | Refills: 0 | Status: SHIPPED | OUTPATIENT
Start: 2022-05-23 | End: 2022-08-23

## 2022-08-23 DIAGNOSIS — I10 ESSENTIAL HYPERTENSION: ICD-10-CM

## 2022-08-23 RX ORDER — METOPROLOL SUCCINATE 100 MG/1
TABLET, EXTENDED RELEASE ORAL
Qty: 90 TABLET | Refills: 0 | Status: SHIPPED | OUTPATIENT
Start: 2022-08-23 | End: 2022-09-16 | Stop reason: SDUPTHER

## 2022-09-16 ENCOUNTER — OFFICE VISIT (OUTPATIENT)
Dept: FAMILY MEDICINE CLINIC | Age: 52
End: 2022-09-16
Payer: COMMERCIAL

## 2022-09-16 VITALS
SYSTOLIC BLOOD PRESSURE: 132 MMHG | OXYGEN SATURATION: 98 % | HEIGHT: 72 IN | WEIGHT: 315 LBS | BODY MASS INDEX: 42.66 KG/M2 | DIASTOLIC BLOOD PRESSURE: 80 MMHG | HEART RATE: 63 BPM

## 2022-09-16 DIAGNOSIS — G62.9 NEUROPATHY: ICD-10-CM

## 2022-09-16 DIAGNOSIS — I10 ESSENTIAL HYPERTENSION: ICD-10-CM

## 2022-09-16 DIAGNOSIS — Z00.00 ENCOUNTER FOR WELL ADULT EXAM WITHOUT ABNORMAL FINDINGS: Primary | ICD-10-CM

## 2022-09-16 DIAGNOSIS — Z00.00 ENCOUNTER FOR WELL ADULT EXAM WITHOUT ABNORMAL FINDINGS: ICD-10-CM

## 2022-09-16 DIAGNOSIS — Z23 NEED FOR VACCINATION: ICD-10-CM

## 2022-09-16 LAB
A/G RATIO: 1.6 (ref 1.1–2.2)
ALBUMIN SERPL-MCNC: 4.2 G/DL (ref 3.4–5)
ALP BLD-CCNC: 102 U/L (ref 40–129)
ALT SERPL-CCNC: 28 U/L (ref 10–40)
ANION GAP SERPL CALCULATED.3IONS-SCNC: 13 MMOL/L (ref 3–16)
AST SERPL-CCNC: 22 U/L (ref 15–37)
BILIRUB SERPL-MCNC: 0.6 MG/DL (ref 0–1)
BUN BLDV-MCNC: 13 MG/DL (ref 7–20)
CALCIUM SERPL-MCNC: 9.4 MG/DL (ref 8.3–10.6)
CHLORIDE BLD-SCNC: 104 MMOL/L (ref 99–110)
CHOLESTEROL, TOTAL: 200 MG/DL (ref 0–199)
CO2: 24 MMOL/L (ref 21–32)
CREAT SERPL-MCNC: 1.1 MG/DL (ref 0.9–1.3)
GFR AFRICAN AMERICAN: >60
GFR NON-AFRICAN AMERICAN: >60
GLUCOSE BLD-MCNC: 112 MG/DL (ref 70–99)
HDLC SERPL-MCNC: 30 MG/DL (ref 40–60)
LDL CHOLESTEROL CALCULATED: 132 MG/DL
MAGNESIUM: 2.3 MG/DL (ref 1.8–2.4)
POTASSIUM SERPL-SCNC: 4.6 MMOL/L (ref 3.5–5.1)
SODIUM BLD-SCNC: 141 MMOL/L (ref 136–145)
TOTAL PROTEIN: 6.9 G/DL (ref 6.4–8.2)
TRIGL SERPL-MCNC: 190 MG/DL (ref 0–150)
VLDLC SERPL CALC-MCNC: 38 MG/DL

## 2022-09-16 PROCEDURE — 99396 PREV VISIT EST AGE 40-64: CPT | Performed by: NURSE PRACTITIONER

## 2022-09-16 PROCEDURE — 90674 CCIIV4 VAC NO PRSV 0.5 ML IM: CPT | Performed by: NURSE PRACTITIONER

## 2022-09-16 PROCEDURE — 90471 IMMUNIZATION ADMIN: CPT | Performed by: NURSE PRACTITIONER

## 2022-09-16 RX ORDER — SUCRALFATE 1 G/1
1 TABLET ORAL 4 TIMES DAILY
Qty: 120 TABLET | Refills: 1 | Status: SHIPPED | OUTPATIENT
Start: 2022-09-16

## 2022-09-16 RX ORDER — METOPROLOL SUCCINATE 100 MG/1
TABLET, EXTENDED RELEASE ORAL
Qty: 90 TABLET | Refills: 2 | Status: SHIPPED | OUTPATIENT
Start: 2022-09-16

## 2022-09-16 ASSESSMENT — PATIENT HEALTH QUESTIONNAIRE - PHQ9
SUM OF ALL RESPONSES TO PHQ9 QUESTIONS 1 & 2: 0
SUM OF ALL RESPONSES TO PHQ QUESTIONS 1-9: 0
SUM OF ALL RESPONSES TO PHQ QUESTIONS 1-9: 0
1. LITTLE INTEREST OR PLEASURE IN DOING THINGS: 0
2. FEELING DOWN, DEPRESSED OR HOPELESS: 0
SUM OF ALL RESPONSES TO PHQ QUESTIONS 1-9: 0
SUM OF ALL RESPONSES TO PHQ QUESTIONS 1-9: 0

## 2022-09-16 ASSESSMENT — ENCOUNTER SYMPTOMS
RESPIRATORY NEGATIVE: 1
GASTROINTESTINAL NEGATIVE: 1
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
## 2022-09-17 LAB
ESTIMATED AVERAGE GLUCOSE: 122.6 MG/DL
HBA1C MFR BLD: 5.9 %

## 2023-08-07 DIAGNOSIS — I10 ESSENTIAL HYPERTENSION: ICD-10-CM

## 2023-08-07 RX ORDER — METOPROLOL SUCCINATE 100 MG/1
TABLET, EXTENDED RELEASE ORAL
Qty: 90 TABLET | Refills: 0 | Status: SHIPPED | OUTPATIENT
Start: 2023-08-07

## 2023-10-16 ENCOUNTER — OFFICE VISIT (OUTPATIENT)
Dept: FAMILY MEDICINE CLINIC | Age: 53
End: 2023-10-16
Payer: COMMERCIAL

## 2023-10-16 ENCOUNTER — TELEPHONE (OUTPATIENT)
Dept: FAMILY MEDICINE CLINIC | Age: 53
End: 2023-10-16

## 2023-10-16 VITALS
BODY MASS INDEX: 42.66 KG/M2 | WEIGHT: 315 LBS | SYSTOLIC BLOOD PRESSURE: 132 MMHG | HEART RATE: 69 BPM | HEIGHT: 72 IN | OXYGEN SATURATION: 97 % | DIASTOLIC BLOOD PRESSURE: 74 MMHG

## 2023-10-16 DIAGNOSIS — R10.13 EPIGASTRIC ABDOMINAL PAIN: Primary | ICD-10-CM

## 2023-10-16 DIAGNOSIS — Z23 NEED FOR VACCINATION: ICD-10-CM

## 2023-10-16 DIAGNOSIS — K21.9 GASTROESOPHAGEAL REFLUX DISEASE WITHOUT ESOPHAGITIS: ICD-10-CM

## 2023-10-16 PROCEDURE — 90471 IMMUNIZATION ADMIN: CPT | Performed by: NURSE PRACTITIONER

## 2023-10-16 PROCEDURE — 99213 OFFICE O/P EST LOW 20 MIN: CPT | Performed by: NURSE PRACTITIONER

## 2023-10-16 PROCEDURE — 90674 CCIIV4 VAC NO PRSV 0.5 ML IM: CPT | Performed by: NURSE PRACTITIONER

## 2023-10-16 PROCEDURE — 3075F SYST BP GE 130 - 139MM HG: CPT | Performed by: NURSE PRACTITIONER

## 2023-10-16 PROCEDURE — 3078F DIAST BP <80 MM HG: CPT | Performed by: NURSE PRACTITIONER

## 2023-10-16 RX ORDER — OMEPRAZOLE 20 MG/1
20 CAPSULE, DELAYED RELEASE ORAL
Qty: 30 CAPSULE | Refills: 5 | Status: SHIPPED | OUTPATIENT
Start: 2023-10-16

## 2023-10-16 ASSESSMENT — ENCOUNTER SYMPTOMS
COUGH: 0
SHORTNESS OF BREATH: 0
WHEEZING: 0
RESPIRATORY NEGATIVE: 1
ABDOMINAL PAIN: 1

## 2023-10-16 ASSESSMENT — PATIENT HEALTH QUESTIONNAIRE - PHQ9
SUM OF ALL RESPONSES TO PHQ QUESTIONS 1-9: 0
SUM OF ALL RESPONSES TO PHQ9 QUESTIONS 1 & 2: 0
SUM OF ALL RESPONSES TO PHQ QUESTIONS 1-9: 0
2. FEELING DOWN, DEPRESSED OR HOPELESS: 0
SUM OF ALL RESPONSES TO PHQ QUESTIONS 1-9: 0
1. LITTLE INTEREST OR PLEASURE IN DOING THINGS: 0
SUM OF ALL RESPONSES TO PHQ QUESTIONS 1-9: 0

## 2023-10-16 NOTE — TELEPHONE ENCOUNTER
NT Patient has been having chest pain when laying flat. The pains are off and on but have been occurring for a week now. Patient is scheduled at 9:40 today with EG.

## 2023-10-16 NOTE — PROGRESS NOTES
Patient: Martin Silva is a 47 y.o. male who presents today with the following Chief Complaint(s):  Chief Complaint   Patient presents with    Chest Pain       Assessment:  Encounter Diagnoses   Name Primary? Epigastric abdominal pain Yes    Gastroesophageal reflux disease without esophagitis     Need for vaccination        Plan:  1. Epigastric abdominal pain  Treat with prilosec and patient will follow up with GI.   - omeprazole (PRILOSEC) 20 MG delayed release capsule; Take 1 capsule by mouth every morning (before breakfast)  Dispense: 30 capsule; Refill: 5    2. Gastroesophageal reflux disease without esophagitis  Treat with prilosec and follow up with GI. Patient advised to sleep propped up on pillows to help as well. - omeprazole (PRILOSEC) 20 MG delayed release capsule; Take 1 capsule by mouth every morning (before breakfast)  Dispense: 30 capsule; Refill: 5    3. Need for vaccination  - Influenza, FLUCELVAX, (age 10 mo+), IM, Preservative Free, 0.5 mL      HPI  Patient presents today with concerns of pain in his epigastric area. He states it occurs when he lays down at night. The pain is sharp. He has a history of GERD and has previously been on nexium for gerd but has not taking it in years. He does use Carafate as needed. He states the pain started in the past 7-10 days. He states he tried taking some Pepcid with no improvement. Pain is better with sitting propped up. He denies any vomiting or reflux in his throat.        Current Outpatient Medications   Medication Sig Dispense Refill    omeprazole (PRILOSEC) 20 MG delayed release capsule Take 1 capsule by mouth every morning (before breakfast) 30 capsule 5    metoprolol succinate (TOPROL XL) 100 MG extended release tablet TAKE ONE TABLET BY MOUTH DAILY 90 tablet 0    sucralfate (CARAFATE) 1 GM tablet Take 1 tablet by mouth 4 times daily 120 tablet 1    ibuprofen (ADVIL;MOTRIN) 200 MG CAPS Take 1 capsule by mouth as needed for Fever       No current

## 2023-10-30 ENCOUNTER — OFFICE VISIT (OUTPATIENT)
Dept: FAMILY MEDICINE CLINIC | Age: 53
End: 2023-10-30
Payer: COMMERCIAL

## 2023-10-30 VITALS
BODY MASS INDEX: 42.66 KG/M2 | WEIGHT: 315 LBS | HEIGHT: 72 IN | DIASTOLIC BLOOD PRESSURE: 78 MMHG | SYSTOLIC BLOOD PRESSURE: 128 MMHG | OXYGEN SATURATION: 98 % | HEART RATE: 61 BPM

## 2023-10-30 DIAGNOSIS — I10 ESSENTIAL HYPERTENSION: ICD-10-CM

## 2023-10-30 DIAGNOSIS — R73.03 PREDIABETES: ICD-10-CM

## 2023-10-30 DIAGNOSIS — Z00.00 ENCOUNTER FOR WELL ADULT EXAM WITHOUT ABNORMAL FINDINGS: Primary | ICD-10-CM

## 2023-10-30 DIAGNOSIS — G47.33 SEVERE OBSTRUCTIVE SLEEP APNEA: ICD-10-CM

## 2023-10-30 DIAGNOSIS — Z00.00 ENCOUNTER FOR WELL ADULT EXAM WITHOUT ABNORMAL FINDINGS: ICD-10-CM

## 2023-10-30 PROCEDURE — 99396 PREV VISIT EST AGE 40-64: CPT | Performed by: NURSE PRACTITIONER

## 2023-10-30 PROCEDURE — 3078F DIAST BP <80 MM HG: CPT | Performed by: NURSE PRACTITIONER

## 2023-10-30 PROCEDURE — 3074F SYST BP LT 130 MM HG: CPT | Performed by: NURSE PRACTITIONER

## 2023-10-30 RX ORDER — CELECOXIB 100 MG/1
100 CAPSULE ORAL 2 TIMES DAILY
Qty: 180 CAPSULE | Refills: 0 | Status: SHIPPED | OUTPATIENT
Start: 2023-10-30

## 2023-10-30 ASSESSMENT — PATIENT HEALTH QUESTIONNAIRE - PHQ9
1. LITTLE INTEREST OR PLEASURE IN DOING THINGS: 0
SUM OF ALL RESPONSES TO PHQ QUESTIONS 1-9: 0
SUM OF ALL RESPONSES TO PHQ QUESTIONS 1-9: 0
2. FEELING DOWN, DEPRESSED OR HOPELESS: 0
SUM OF ALL RESPONSES TO PHQ QUESTIONS 1-9: 0
SUM OF ALL RESPONSES TO PHQ QUESTIONS 1-9: 0
SUM OF ALL RESPONSES TO PHQ9 QUESTIONS 1 & 2: 0

## 2023-10-30 ASSESSMENT — ENCOUNTER SYMPTOMS
SHORTNESS OF BREATH: 0
WHEEZING: 0
RESPIRATORY NEGATIVE: 1
GASTROINTESTINAL NEGATIVE: 1
COUGH: 0
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
## 2023-10-31 LAB
ALBUMIN SERPL-MCNC: 3.9 G/DL (ref 3.4–5)
ALBUMIN/GLOB SERPL: 1.4 {RATIO} (ref 1.1–2.2)
ALP SERPL-CCNC: 103 U/L (ref 40–129)
ALT SERPL-CCNC: 25 U/L (ref 10–40)
ANION GAP SERPL CALCULATED.3IONS-SCNC: 10 MMOL/L (ref 3–16)
AST SERPL-CCNC: 20 U/L (ref 15–37)
BILIRUB SERPL-MCNC: 0.6 MG/DL (ref 0–1)
BUN SERPL-MCNC: 13 MG/DL (ref 7–20)
CALCIUM SERPL-MCNC: 9 MG/DL (ref 8.3–10.6)
CHLORIDE SERPL-SCNC: 104 MMOL/L (ref 99–110)
CHOLEST SERPL-MCNC: 188 MG/DL (ref 0–199)
CO2 SERPL-SCNC: 26 MMOL/L (ref 21–32)
CREAT SERPL-MCNC: 1 MG/DL (ref 0.9–1.3)
EST. AVERAGE GLUCOSE BLD GHB EST-MCNC: 122.6 MG/DL
GFR SERPLBLD CREATININE-BSD FMLA CKD-EPI: >60 ML/MIN/{1.73_M2}
GLUCOSE SERPL-MCNC: 115 MG/DL (ref 70–99)
HBA1C MFR BLD: 5.9 %
HDLC SERPL-MCNC: 29 MG/DL (ref 40–60)
LDLC SERPL CALC-MCNC: 115 MG/DL
POTASSIUM SERPL-SCNC: 4.6 MMOL/L (ref 3.5–5.1)
PROT SERPL-MCNC: 6.6 G/DL (ref 6.4–8.2)
SODIUM SERPL-SCNC: 140 MMOL/L (ref 136–145)
TRIGL SERPL-MCNC: 222 MG/DL (ref 0–150)
VLDLC SERPL CALC-MCNC: 44 MG/DL

## 2023-11-06 DIAGNOSIS — I10 ESSENTIAL HYPERTENSION: ICD-10-CM

## 2023-11-06 RX ORDER — METOPROLOL SUCCINATE 100 MG/1
TABLET, EXTENDED RELEASE ORAL
Qty: 90 TABLET | Refills: 0 | Status: SHIPPED | OUTPATIENT
Start: 2023-11-06

## 2023-11-06 NOTE — TELEPHONE ENCOUNTER
Last Office Visit  -  10/30/2023  Next Office Visit  -  12/11/2023    Last Filled  -    Last UDS -    Contract -

## 2023-11-06 NOTE — TELEPHONE ENCOUNTER
Pharmacy requesting refill    metoprolol succinate (TOPROL XL) 100 MG extended release tablet       [facility] [address]

## 2023-12-11 ENCOUNTER — OFFICE VISIT (OUTPATIENT)
Dept: FAMILY MEDICINE CLINIC | Age: 53
End: 2023-12-11
Payer: COMMERCIAL

## 2023-12-11 VITALS
HEART RATE: 54 BPM | DIASTOLIC BLOOD PRESSURE: 72 MMHG | HEIGHT: 72 IN | OXYGEN SATURATION: 98 % | SYSTOLIC BLOOD PRESSURE: 136 MMHG | BODY MASS INDEX: 42.66 KG/M2 | WEIGHT: 315 LBS

## 2023-12-11 DIAGNOSIS — I10 ESSENTIAL HYPERTENSION: Primary | ICD-10-CM

## 2023-12-11 DIAGNOSIS — M54.50 LOW BACK PAIN, UNSPECIFIED BACK PAIN LATERALITY, UNSPECIFIED CHRONICITY, UNSPECIFIED WHETHER SCIATICA PRESENT: ICD-10-CM

## 2023-12-11 DIAGNOSIS — K21.9 GASTROESOPHAGEAL REFLUX DISEASE WITHOUT ESOPHAGITIS: ICD-10-CM

## 2023-12-11 PROCEDURE — 3078F DIAST BP <80 MM HG: CPT | Performed by: NURSE PRACTITIONER

## 2023-12-11 PROCEDURE — 3074F SYST BP LT 130 MM HG: CPT | Performed by: NURSE PRACTITIONER

## 2023-12-11 PROCEDURE — 99214 OFFICE O/P EST MOD 30 MIN: CPT | Performed by: NURSE PRACTITIONER

## 2023-12-11 RX ORDER — OMEPRAZOLE 20 MG/1
20 CAPSULE, DELAYED RELEASE ORAL
Qty: 30 CAPSULE | Refills: 5 | Status: SHIPPED | OUTPATIENT
Start: 2023-12-11 | End: 2023-12-11 | Stop reason: SDUPTHER

## 2023-12-11 RX ORDER — OMEPRAZOLE 20 MG/1
20 CAPSULE, DELAYED RELEASE ORAL
Qty: 90 CAPSULE | Refills: 1 | Status: SHIPPED | OUTPATIENT
Start: 2023-12-11

## 2023-12-11 ASSESSMENT — PATIENT HEALTH QUESTIONNAIRE - PHQ9
1. LITTLE INTEREST OR PLEASURE IN DOING THINGS: 0
SUM OF ALL RESPONSES TO PHQ QUESTIONS 1-9: 0
2. FEELING DOWN, DEPRESSED OR HOPELESS: 0
SUM OF ALL RESPONSES TO PHQ QUESTIONS 1-9: 0
SUM OF ALL RESPONSES TO PHQ9 QUESTIONS 1 & 2: 0
SUM OF ALL RESPONSES TO PHQ QUESTIONS 1-9: 0
SUM OF ALL RESPONSES TO PHQ QUESTIONS 1-9: 0

## 2023-12-11 ASSESSMENT — ENCOUNTER SYMPTOMS
GASTROINTESTINAL NEGATIVE: 1
RESPIRATORY NEGATIVE: 1

## 2023-12-11 NOTE — PROGRESS NOTES
Patient: Christian Ordaz is a 47 y.o. male who presents today with the following Chief Complaint(s):  Chief Complaint   Patient presents with    Follow-up    Hypertension    Back Pain     X 3-4 days       Assessment:  Encounter Diagnoses   Name Primary? Essential hypertension Yes    Gastroesophageal reflux disease without esophagitis     Low back pain, unspecified back pain laterality, unspecified chronicity, unspecified whether sciatica present        Plan:  1. Essential hypertension  BP is stable, continue current medications. Follow up in 6 months    2. Gastroesophageal reflux disease without esophagitis  Stable, continue prilosec daily  - omeprazole (PRILOSEC) 20 MG delayed release capsule; Take 1 capsule by mouth every morning (before breakfast)  Dispense: 90 capsule; Refill: 1    3. Low back pain, unspecified back pain laterality, unspecified chronicity, unspecified whether sciatica present  No pain today during visit. Advised following up with chiropractor and if no improvement or worsening pain will consider referral to spine specialist.       HPI   Patient presents today for a follow up on hypertension. BP is in good range today. He has concerns of a pulsing pain in his mid to lower back that occurs when he leans his head back. He states it happened this morning but right now it is not happening. He states there is no pain when he is just sitting. Current Outpatient Medications   Medication Sig Dispense Refill    omeprazole (PRILOSEC) 20 MG delayed release capsule Take 1 capsule by mouth every morning (before breakfast) 90 capsule 1    metoprolol succinate (TOPROL XL) 100 MG extended release tablet TAKE ONE TABLET BY MOUTH DAILY 90 tablet 0    celecoxib (CELEBREX) 100 MG capsule Take 1 capsule by mouth 2 times daily 180 capsule 0    sucralfate (CARAFATE) 1 GM tablet Take 1 tablet by mouth 4 times daily 120 tablet 1     No current facility-administered medications for this visit.        Patient's

## 2024-01-26 RX ORDER — CELECOXIB 100 MG/1
100 CAPSULE ORAL 2 TIMES DAILY
Qty: 180 CAPSULE | Refills: 1 | Status: SHIPPED | OUTPATIENT
Start: 2024-01-26

## 2024-01-26 NOTE — TELEPHONE ENCOUNTER
Last Office Visit  -  12/11/23  Next Office Visit  -  n/a    Last Filled  -  10/30/23  Last UDS -    Contract -

## 2024-02-01 ENCOUNTER — OFFICE VISIT (OUTPATIENT)
Dept: FAMILY MEDICINE CLINIC | Age: 54
End: 2024-02-01
Payer: COMMERCIAL

## 2024-02-01 VITALS
DIASTOLIC BLOOD PRESSURE: 86 MMHG | WEIGHT: 315 LBS | SYSTOLIC BLOOD PRESSURE: 130 MMHG | BODY MASS INDEX: 42.66 KG/M2 | OXYGEN SATURATION: 97 % | HEIGHT: 72 IN | HEART RATE: 65 BPM

## 2024-02-01 DIAGNOSIS — R20.0 BILATERAL LEG NUMBNESS: ICD-10-CM

## 2024-02-01 DIAGNOSIS — M54.50 CHRONIC LOW BACK PAIN, UNSPECIFIED BACK PAIN LATERALITY, UNSPECIFIED WHETHER SCIATICA PRESENT: Primary | ICD-10-CM

## 2024-02-01 DIAGNOSIS — G89.29 CHRONIC LOW BACK PAIN, UNSPECIFIED BACK PAIN LATERALITY, UNSPECIFIED WHETHER SCIATICA PRESENT: Primary | ICD-10-CM

## 2024-02-01 DIAGNOSIS — R29.898 BILATERAL LEG WEAKNESS: ICD-10-CM

## 2024-02-01 PROCEDURE — 3079F DIAST BP 80-89 MM HG: CPT | Performed by: NURSE PRACTITIONER

## 2024-02-01 PROCEDURE — 99214 OFFICE O/P EST MOD 30 MIN: CPT | Performed by: NURSE PRACTITIONER

## 2024-02-01 PROCEDURE — 3075F SYST BP GE 130 - 139MM HG: CPT | Performed by: NURSE PRACTITIONER

## 2024-02-01 ASSESSMENT — PATIENT HEALTH QUESTIONNAIRE - PHQ9
SUM OF ALL RESPONSES TO PHQ9 QUESTIONS 1 & 2: 0
SUM OF ALL RESPONSES TO PHQ QUESTIONS 1-9: 0
1. LITTLE INTEREST OR PLEASURE IN DOING THINGS: 0
SUM OF ALL RESPONSES TO PHQ QUESTIONS 1-9: 0
2. FEELING DOWN, DEPRESSED OR HOPELESS: 0
SUM OF ALL RESPONSES TO PHQ QUESTIONS 1-9: 0
SUM OF ALL RESPONSES TO PHQ QUESTIONS 1-9: 0

## 2024-02-01 ASSESSMENT — ENCOUNTER SYMPTOMS
RESPIRATORY NEGATIVE: 1
GASTROINTESTINAL NEGATIVE: 1
WHEEZING: 0
SHORTNESS OF BREATH: 0
BACK PAIN: 1
COUGH: 0

## 2024-02-01 NOTE — PROGRESS NOTES
Patient: Richardson Vargas is a 53 y.o. male who presents today with the following Chief Complaint(s):  Chief Complaint   Patient presents with    Other     Weak/numb legs- tightness around lower stomach x few weeks        Assessment:  Encounter Diagnoses   Name Primary?    Chronic low back pain, unspecified back pain laterality, unspecified whether sciatica present Yes    Bilateral leg weakness     Bilateral leg numbness        Plan:  1. Chronic low back pain, unspecified back pain laterality, unspecified whether sciatica present  Check MrI. Continue celebrex to help decrease inflammation, advised to apply ice to lower back.   - MRI LUMBAR SPINE WO CONTRAST; Future    2. Bilateral leg weakness  - MRI LUMBAR SPINE WO CONTRAST; Future    3. Bilateral leg numbness  - MRI LUMBAR SPINE WO CONTRAST; Future      HPI  Patient presents today with concerns of bilateral numbness and weakness in the thighs. He feels a tightness in his lower abdomen when he stands up. He states it is the top and sides of bilateral thighs and he feels it when he stands up. He has trouble walking as well because of this. He feels weak and has to think about taking a step. He is worried he may fall at times. He denies any loss of bowel or bladder control. He does have some low back pain but states it is not constant. He states he does not have pain when walking but the pain comes and goes with certain movements. He does have the numbness and weakness as soon as he stands up.   He does have a history of low back pain.       Current Outpatient Medications   Medication Sig Dispense Refill    celecoxib (CELEBREX) 100 MG capsule TAKE 1 CAPSULE BY MOUTH TWICE A  capsule 1    omeprazole (PRILOSEC) 20 MG delayed release capsule Take 1 capsule by mouth every morning (before breakfast) 90 capsule 1    metoprolol succinate (TOPROL XL) 100 MG extended release tablet TAKE ONE TABLET BY MOUTH DAILY 90 tablet 0    sucralfate (CARAFATE) 1 GM tablet Take 1

## 2024-02-05 ENCOUNTER — TELEPHONE (OUTPATIENT)
Dept: FAMILY MEDICINE CLINIC | Age: 54
End: 2024-02-05

## 2024-02-05 NOTE — TELEPHONE ENCOUNTER
MRI of lumbar spine needs PA. [employer] is requesting office notes to be faxed, so they can submit to the insurance.     FAX# [phone] Delbert Driver      
faxed  
Detail Level: Detailed
Quality 402: Tobacco Use And Help With Quitting Among Adolescents: Patient screened for tobacco and never smoked

## 2024-02-08 DIAGNOSIS — I10 ESSENTIAL HYPERTENSION: ICD-10-CM

## 2024-02-08 RX ORDER — METOPROLOL SUCCINATE 100 MG/1
TABLET, EXTENDED RELEASE ORAL
Qty: 90 TABLET | Refills: 0 | Status: SHIPPED | OUTPATIENT
Start: 2024-02-08

## 2024-02-08 NOTE — TELEPHONE ENCOUNTER
Last Office Visit  -  2/1/24  Next Office Visit  -  n/a    Last Filled  -  11/6/23  Last UDS -    Contract -

## 2024-02-09 ENCOUNTER — TELEPHONE (OUTPATIENT)
Dept: FAMILY MEDICINE CLINIC | Age: 54
End: 2024-02-09

## 2024-02-09 RX ORDER — METOPROLOL SUCCINATE 100 MG/1
TABLET, EXTENDED RELEASE ORAL
Qty: 90 TABLET | Refills: 0 | OUTPATIENT
Start: 2024-02-09

## 2024-02-09 NOTE — TELEPHONE ENCOUNTER
Call and let patient know it has been denied. See if he wants to proceed with self pay. The alternative is completing 6 weeks of physical therapy prior to insurance covering the MRI.

## 2024-02-09 NOTE — TELEPHONE ENCOUNTER
Pt notified- pt will think about what he wants to do over the weekend and call the office back with an answer

## 2024-02-09 NOTE — TELEPHONE ENCOUNTER
MRI for lumbar spine, scheduled today 2/9/24 @ 5pm at [facility]. It has been in review with Insurance for a couple days, and just found out this morning it has been  denied by insurance. Does PCP want to appeal? If so, PCP would need to call with additional information. PHONE # [phone] Case # [id number]   Notes do not show 6 week of provider directed treatment within 3 months without improvement. [employer] asked if we can contact patient with next steps being taken, and also give [employer] update by 1pm if they need to cancel or keep his appointment  If not appealing, MRI will need to be cancelled, offer self pay or follow up with  PCP.

## 2024-02-14 ENCOUNTER — TELEPHONE (OUTPATIENT)
Dept: FAMILY MEDICINE CLINIC | Age: 54
End: 2024-02-14

## 2024-02-14 DIAGNOSIS — M43.16 SPONDYLOLISTHESIS AT L4-L5 LEVEL: Primary | ICD-10-CM

## 2024-02-14 NOTE — TELEPHONE ENCOUNTER
MRI shows some disc displacement at L3-L4 and disc protrusion on L4-L5. And L5 and S1 disc displacement. There is no mention of any nerve root impingement which is good. I would like for him to follow up with our back and spine center to further evaluate.   A member of the [facility] will contact you within 2 business days to help schedule your follow-up appointment.  If you have questions, or would like to schedule sooner, please call [phone].

## 2024-02-14 NOTE — TELEPHONE ENCOUNTER
----- Message from Wendy Singh sent at 2/14/2024 10:17 AM EST -----  Subject: Results Request    QUESTIONS  Results: MRI BACK; Ordered by: Ros Salmon   Date Performed: 2024-02-09  ---------------------------------------------------------------------------  --------------  CALL BACK INFO    [phone]; OK to leave message on voicemail  ---------------------------------------------------------------------------  --------------

## 2024-03-01 ENCOUNTER — APPOINTMENT (OUTPATIENT)
Dept: CT IMAGING | Age: 54
End: 2024-03-01
Payer: COMMERCIAL

## 2024-03-01 ENCOUNTER — APPOINTMENT (OUTPATIENT)
Dept: MRI IMAGING | Age: 54
End: 2024-03-01
Payer: COMMERCIAL

## 2024-03-01 ENCOUNTER — HOSPITAL ENCOUNTER (INPATIENT)
Age: 54
LOS: 6 days | Discharge: HOME | End: 2024-03-07
Attending: EMERGENCY MEDICINE | Admitting: FAMILY MEDICINE
Payer: COMMERCIAL

## 2024-03-01 DIAGNOSIS — M47.14 MYELOPATHY OF THORACIC REGION: Primary | ICD-10-CM

## 2024-03-01 PROBLEM — R29.90 ALTERATION IN NEUROLOGICAL STATUS: Status: ACTIVE | Noted: 2024-03-01

## 2024-03-01 PROCEDURE — 8E0WXBF COMPUTER ASSISTED PROCEDURE OF TRUNK REGION, WITH FLUOROSCOPY: ICD-10-PCS | Performed by: NEUROLOGICAL SURGERY

## 2024-03-01 PROCEDURE — 72141 MRI NECK SPINE W/O DYE: CPT

## 2024-03-01 PROCEDURE — 72131 CT LUMBAR SPINE W/O DYE: CPT

## 2024-03-01 PROCEDURE — 4A11X4G MONITORING OF PERIPHERAL NERVOUS ELECTRICAL ACTIVITY, INTRAOPERATIVE, EXTERNAL APPROACH: ICD-10-PCS | Performed by: NEUROLOGICAL SURGERY

## 2024-03-01 PROCEDURE — 99222 1ST HOSP IP/OBS MODERATE 55: CPT | Performed by: NURSE PRACTITIONER

## 2024-03-01 PROCEDURE — 1200000000 HC SEMI PRIVATE

## 2024-03-01 PROCEDURE — 00NX0ZZ RELEASE THORACIC SPINAL CORD, OPEN APPROACH: ICD-10-PCS | Performed by: NEUROLOGICAL SURGERY

## 2024-03-01 PROCEDURE — 72146 MRI CHEST SPINE W/O DYE: CPT

## 2024-03-01 PROCEDURE — 99285 EMERGENCY DEPT VISIT HI MDM: CPT

## 2024-03-01 RX ORDER — SODIUM CHLORIDE 0.9 % (FLUSH) 0.9 %
5-40 SYRINGE (ML) INJECTION PRN
Status: DISCONTINUED | OUTPATIENT
Start: 2024-03-01 | End: 2024-03-07 | Stop reason: HOSPADM

## 2024-03-01 RX ORDER — SODIUM CHLORIDE 0.9 % (FLUSH) 0.9 %
5-40 SYRINGE (ML) INJECTION EVERY 12 HOURS SCHEDULED
Status: DISCONTINUED | OUTPATIENT
Start: 2024-03-02 | End: 2024-03-07 | Stop reason: HOSPADM

## 2024-03-01 RX ORDER — MAGNESIUM SULFATE IN WATER 40 MG/ML
2000 INJECTION, SOLUTION INTRAVENOUS PRN
Status: DISCONTINUED | OUTPATIENT
Start: 2024-03-01 | End: 2024-03-07 | Stop reason: HOSPADM

## 2024-03-01 RX ORDER — METHOCARBAMOL 500 MG/1
1000 TABLET, FILM COATED ORAL 4 TIMES DAILY PRN
Status: DISCONTINUED | OUTPATIENT
Start: 2024-03-01 | End: 2024-03-07 | Stop reason: HOSPADM

## 2024-03-01 RX ORDER — ONDANSETRON 4 MG/1
4 TABLET, ORALLY DISINTEGRATING ORAL EVERY 8 HOURS PRN
Status: DISCONTINUED | OUTPATIENT
Start: 2024-03-01 | End: 2024-03-07 | Stop reason: HOSPADM

## 2024-03-01 RX ORDER — POLYETHYLENE GLYCOL 3350 17 G/17G
17 POWDER, FOR SOLUTION ORAL DAILY PRN
Status: DISCONTINUED | OUTPATIENT
Start: 2024-03-01 | End: 2024-03-07 | Stop reason: HOSPADM

## 2024-03-01 RX ORDER — POTASSIUM CHLORIDE 20 MEQ/1
40 TABLET, EXTENDED RELEASE ORAL PRN
Status: DISCONTINUED | OUTPATIENT
Start: 2024-03-01 | End: 2024-03-07 | Stop reason: HOSPADM

## 2024-03-01 RX ORDER — METOPROLOL SUCCINATE 100 MG/1
100 TABLET, EXTENDED RELEASE ORAL DAILY
Status: DISCONTINUED | OUTPATIENT
Start: 2024-03-02 | End: 2024-03-07 | Stop reason: HOSPADM

## 2024-03-01 RX ORDER — DEXAMETHASONE SODIUM PHOSPHATE 4 MG/ML
4 INJECTION, SOLUTION INTRA-ARTICULAR; INTRALESIONAL; INTRAMUSCULAR; INTRAVENOUS; SOFT TISSUE EVERY 6 HOURS
Status: DISCONTINUED | OUTPATIENT
Start: 2024-03-02 | End: 2024-03-03

## 2024-03-01 RX ORDER — POTASSIUM CHLORIDE 7.45 MG/ML
10 INJECTION INTRAVENOUS PRN
Status: DISCONTINUED | OUTPATIENT
Start: 2024-03-01 | End: 2024-03-07 | Stop reason: HOSPADM

## 2024-03-01 RX ORDER — ACETAMINOPHEN 650 MG/1
650 SUPPOSITORY RECTAL EVERY 6 HOURS PRN
Status: DISCONTINUED | OUTPATIENT
Start: 2024-03-01 | End: 2024-03-02

## 2024-03-01 RX ORDER — SODIUM CHLORIDE 9 MG/ML
INJECTION, SOLUTION INTRAVENOUS PRN
Status: DISCONTINUED | OUTPATIENT
Start: 2024-03-01 | End: 2024-03-07 | Stop reason: HOSPADM

## 2024-03-01 RX ORDER — ONDANSETRON 2 MG/ML
4 INJECTION INTRAMUSCULAR; INTRAVENOUS EVERY 6 HOURS PRN
Status: DISCONTINUED | OUTPATIENT
Start: 2024-03-01 | End: 2024-03-07 | Stop reason: HOSPADM

## 2024-03-01 RX ORDER — ENOXAPARIN SODIUM 100 MG/ML
40 INJECTION SUBCUTANEOUS 2 TIMES DAILY
Status: DISCONTINUED | OUTPATIENT
Start: 2024-03-01 | End: 2024-03-04

## 2024-03-01 RX ORDER — PANTOPRAZOLE SODIUM 40 MG/1
40 TABLET, DELAYED RELEASE ORAL
Status: DISCONTINUED | OUTPATIENT
Start: 2024-03-02 | End: 2024-03-07 | Stop reason: HOSPADM

## 2024-03-01 RX ORDER — ACETAMINOPHEN 325 MG/1
650 TABLET ORAL EVERY 6 HOURS PRN
Status: DISCONTINUED | OUTPATIENT
Start: 2024-03-01 | End: 2024-03-02

## 2024-03-01 NOTE — ED PROVIDER NOTES
at the T3 level.  C-spine did not have such changes.  Neurosurgical consultation requested but still waiting to hear back from the referring neurosurgeon but the plan will be to admit the patient with formal recs forthcoming shortly from neurosurgery with regard to the definitive treatment for the thoracic myelopathy.    Medical Decision Making  Amount and/or Complexity of Data Reviewed  Radiology: ordered.    Risk  Decision regarding hospitalization.      Clinical Impression     1. Myelopathy of thoracic region        Disposition     DISPOSITION Decision To Admit 03/01/2024 08:12:43 PM        Diagnostic Results and Other Data       RADIOLOGY:  CT LUMBAR SPINE WO CONTRAST   Final Result      1. Lumbar spondylosis. Moderate spinal canal stenosis as described above.   2. No acute fracture      Electronically signed by Maria Luisa Moody      MRI THORACIC SPINE WO CONTRAST   Final Result   CERVICAL SPINE:   1. Multilevel severe pre foraminal and neural foraminal stenosis, on the right at C3-4 and to a lesser degree at C2-3   2. Multilevel severe pre foraminal neural foraminal stenosis, on the left C5-C6 and C6-C7        THORACIC SPINE:   1. Cord myelopathy and severe stenosis with posterior hypertrophic changes impinging the spinal cord with anterior displacement at the T3 level   2. Posterior ligamentous hypertrophy present at T4 and T4-T5 level as described above   2. Further evaluation of the thoracic spinal computed tomography is suggested to assess the degree of posterior ligamentous calcification (OPLL, ossification of posterior longitudinal ligament) versus ligamentous soft tissue hypertrophy.          Electronically signed by Donell Riggs MD      MRI CERVICAL SPINE WO CONTRAST   Final Result   CERVICAL SPINE:   1. Multilevel severe pre foraminal and neural foraminal stenosis, on the right at C3-4 and to a lesser degree at C2-3   2. Multilevel severe pre foraminal neural foraminal stenosis, on the left C5-C6  Meniere's disease, and Sleep apnea.  He has a past surgical history that includes hernia repair; Tonsillectomy and adenoidectomy (child); Colonoscopy (8/12/2013); Upper gastrointestinal endoscopy (8/12/2013); and Upper gastrointestinal endoscopy (11/29/2016).  His family history includes High Blood Pressure in his maternal grandmother.  He reports that he has never smoked. He has never used smokeless tobacco. He reports that he does not drink alcohol and does not use drugs.    Medications     Previous Medications    CELECOXIB (CELEBREX) 100 MG CAPSULE    TAKE 1 CAPSULE BY MOUTH TWICE A DAY    METOPROLOL SUCCINATE (TOPROL XL) 100 MG EXTENDED RELEASE TABLET    TAKE 1 TABLET BY MOUTH DAILY    OMEPRAZOLE (PRILOSEC) 20 MG DELAYED RELEASE CAPSULE    Take 1 capsule by mouth every morning (before breakfast)    SUCRALFATE (CARAFATE) 1 GM TABLET    Take 1 tablet by mouth 4 times daily       Allergies     He is allergic to lisinopril.    Physical Exam     INITIAL VITALS: BP: (!) 179/83, Temp: 98.3 °F (36.8 °C), Pulse: 61, Respirations: 16, SpO2: 98 %   Physical Exam  Vitals and nursing note reviewed.   Constitutional:       General: He is not in acute distress.     Appearance: He is well-developed. He is not diaphoretic.   Cardiovascular:      Rate and Rhythm: Normal rate and regular rhythm.   Pulmonary:      Effort: Pulmonary effort is normal.      Breath sounds: Normal breath sounds.   Skin:     General: Skin is warm and dry.   Neurological:      Mental Status: He is alert and oriented to person, place, and time.      Cranial Nerves: Cranial nerves 2-12 are intact.      Sensory: Sensation is intact.      Motor: Weakness (Both lower extremities, cannot raise off the bed but more than 2 inches) present.      Deep Tendon Reflexes:      Reflex Scores:       Patellar reflexes are 2+ on the right side and 2+ on the left side.       Achilles reflexes are 1+ on the right side and 1+ on the left side.  Psychiatric:         Behavior:
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
## 2024-03-02 ENCOUNTER — ANESTHESIA (OUTPATIENT)
Dept: OPERATING ROOM | Age: 54
End: 2024-03-02
Payer: COMMERCIAL

## 2024-03-02 ENCOUNTER — ANESTHESIA EVENT (OUTPATIENT)
Dept: OPERATING ROOM | Age: 54
End: 2024-03-02
Payer: COMMERCIAL

## 2024-03-02 ENCOUNTER — APPOINTMENT (OUTPATIENT)
Dept: GENERAL RADIOLOGY | Age: 54
End: 2024-03-02
Payer: COMMERCIAL

## 2024-03-02 LAB
ANION GAP SERPL CALCULATED.3IONS-SCNC: 13 MMOL/L (ref 3–16)
BASOPHILS # BLD: 0 K/UL (ref 0–0.2)
BASOPHILS NFR BLD: 0.2 %
BUN SERPL-MCNC: 13 MG/DL (ref 7–20)
CALCIUM SERPL-MCNC: 9.1 MG/DL (ref 8.3–10.6)
CHLORIDE SERPL-SCNC: 104 MMOL/L (ref 99–110)
CO2 SERPL-SCNC: 21 MMOL/L (ref 21–32)
CREAT SERPL-MCNC: 0.9 MG/DL (ref 0.9–1.3)
DEPRECATED RDW RBC AUTO: 14.7 % (ref 12.4–15.4)
EKG ATRIAL RATE: 57 BPM
EKG DIAGNOSIS: NORMAL
EKG P AXIS: 59 DEGREES
EKG P-R INTERVAL: 154 MS
EKG Q-T INTERVAL: 464 MS
EKG QRS DURATION: 98 MS
EKG QTC CALCULATION (BAZETT): 451 MS
EKG R AXIS: 73 DEGREES
EKG T AXIS: 73 DEGREES
EKG VENTRICULAR RATE: 57 BPM
EOSINOPHIL # BLD: 0 K/UL (ref 0–0.6)
EOSINOPHIL NFR BLD: 0.2 %
FLUAV RNA RESP QL NAA+PROBE: NOT DETECTED
FLUBV RNA RESP QL NAA+PROBE: NOT DETECTED
GFR SERPLBLD CREATININE-BSD FMLA CKD-EPI: >60 ML/MIN/{1.73_M2}
GLUCOSE SERPL-MCNC: 173 MG/DL (ref 70–99)
HCT VFR BLD AUTO: 47.3 % (ref 40.5–52.5)
HGB BLD-MCNC: 15.5 G/DL (ref 13.5–17.5)
LYMPHOCYTES # BLD: 1.1 K/UL (ref 1–5.1)
LYMPHOCYTES NFR BLD: 9 %
MAGNESIUM SERPL-MCNC: 2.3 MG/DL (ref 1.8–2.4)
MCH RBC QN AUTO: 28.5 PG (ref 26–34)
MCHC RBC AUTO-ENTMCNC: 32.8 G/DL (ref 31–36)
MCV RBC AUTO: 86.8 FL (ref 80–100)
MONOCYTES # BLD: 0.2 K/UL (ref 0–1.3)
MONOCYTES NFR BLD: 1.8 %
NEUTROPHILS # BLD: 10.5 K/UL (ref 1.7–7.7)
NEUTROPHILS NFR BLD: 88.8 %
PLATELET # BLD AUTO: 250 K/UL (ref 135–450)
PMV BLD AUTO: 8.4 FL (ref 5–10.5)
POTASSIUM SERPL-SCNC: 4.7 MMOL/L (ref 3.5–5.1)
RBC # BLD AUTO: 5.45 M/UL (ref 4.2–5.9)
SARS-COV-2 RNA RESP QL NAA+PROBE: NOT DETECTED
SODIUM SERPL-SCNC: 138 MMOL/L (ref 136–145)
WBC # BLD AUTO: 11.8 K/UL (ref 4–11)

## 2024-03-02 PROCEDURE — 2580000003 HC RX 258: Performed by: NEUROLOGICAL SURGERY

## 2024-03-02 PROCEDURE — 2500000003 HC RX 250 WO HCPCS: Performed by: NEUROLOGICAL SURGERY

## 2024-03-02 PROCEDURE — 6370000000 HC RX 637 (ALT 250 FOR IP)

## 2024-03-02 PROCEDURE — 1200000000 HC SEMI PRIVATE

## 2024-03-02 PROCEDURE — 6360000002 HC RX W HCPCS: Performed by: NURSE PRACTITIONER

## 2024-03-02 PROCEDURE — 80048 BASIC METABOLIC PNL TOTAL CA: CPT

## 2024-03-02 PROCEDURE — 6360000002 HC RX W HCPCS: Performed by: NURSE ANESTHETIST, CERTIFIED REGISTERED

## 2024-03-02 PROCEDURE — 6360000002 HC RX W HCPCS: Performed by: ANESTHESIOLOGY

## 2024-03-02 PROCEDURE — 72070 X-RAY EXAM THORAC SPINE 2VWS: CPT

## 2024-03-02 PROCEDURE — 2580000003 HC RX 258: Performed by: NURSE ANESTHETIST, CERTIFIED REGISTERED

## 2024-03-02 PROCEDURE — 3700000000 HC ANESTHESIA ATTENDED CARE: Performed by: NEUROLOGICAL SURGERY

## 2024-03-02 PROCEDURE — 3700000001 HC ADD 15 MINUTES (ANESTHESIA): Performed by: NEUROLOGICAL SURGERY

## 2024-03-02 PROCEDURE — 87636 SARSCOV2 & INF A&B AMP PRB: CPT

## 2024-03-02 PROCEDURE — 3600000004 HC SURGERY LEVEL 4 BASE: Performed by: NEUROLOGICAL SURGERY

## 2024-03-02 PROCEDURE — 2720000010 HC SURG SUPPLY STERILE: Performed by: NEUROLOGICAL SURGERY

## 2024-03-02 PROCEDURE — 2580000003 HC RX 258

## 2024-03-02 PROCEDURE — 94660 CPAP INITIATION&MGMT: CPT

## 2024-03-02 PROCEDURE — C9290 INJ, BUPIVACAINE LIPOSOME: HCPCS | Performed by: NEUROLOGICAL SURGERY

## 2024-03-02 PROCEDURE — 6360000002 HC RX W HCPCS: Performed by: PHYSICIAN ASSISTANT

## 2024-03-02 PROCEDURE — 36415 COLL VENOUS BLD VENIPUNCTURE: CPT

## 2024-03-02 PROCEDURE — 6360000002 HC RX W HCPCS: Performed by: NEUROLOGICAL SURGERY

## 2024-03-02 PROCEDURE — 7100000001 HC PACU RECOVERY - ADDTL 15 MIN: Performed by: NEUROLOGICAL SURGERY

## 2024-03-02 PROCEDURE — 2580000003 HC RX 258: Performed by: PHYSICIAN ASSISTANT

## 2024-03-02 PROCEDURE — 6370000000 HC RX 637 (ALT 250 FOR IP): Performed by: NURSE PRACTITIONER

## 2024-03-02 PROCEDURE — 2709999900 HC NON-CHARGEABLE SUPPLY: Performed by: NEUROLOGICAL SURGERY

## 2024-03-02 PROCEDURE — 5A09357 ASSISTANCE WITH RESPIRATORY VENTILATION, LESS THAN 24 CONSECUTIVE HOURS, CONTINUOUS POSITIVE AIRWAY PRESSURE: ICD-10-PCS | Performed by: INTERNAL MEDICINE

## 2024-03-02 PROCEDURE — 83735 ASSAY OF MAGNESIUM: CPT

## 2024-03-02 PROCEDURE — 85025 COMPLETE CBC W/AUTO DIFF WBC: CPT

## 2024-03-02 PROCEDURE — 93010 ELECTROCARDIOGRAM REPORT: CPT | Performed by: INTERNAL MEDICINE

## 2024-03-02 PROCEDURE — 93005 ELECTROCARDIOGRAM TRACING: CPT

## 2024-03-02 PROCEDURE — A4217 STERILE WATER/SALINE, 500 ML: HCPCS | Performed by: NEUROLOGICAL SURGERY

## 2024-03-02 PROCEDURE — 2500000003 HC RX 250 WO HCPCS: Performed by: NURSE ANESTHETIST, CERTIFIED REGISTERED

## 2024-03-02 PROCEDURE — 6370000000 HC RX 637 (ALT 250 FOR IP): Performed by: PHYSICIAN ASSISTANT

## 2024-03-02 PROCEDURE — 7100000000 HC PACU RECOVERY - FIRST 15 MIN: Performed by: NEUROLOGICAL SURGERY

## 2024-03-02 PROCEDURE — 3600000014 HC SURGERY LEVEL 4 ADDTL 15MIN: Performed by: NEUROLOGICAL SURGERY

## 2024-03-02 RX ORDER — ONDANSETRON 2 MG/ML
4 INJECTION INTRAMUSCULAR; INTRAVENOUS
Status: DISCONTINUED | OUTPATIENT
Start: 2024-03-02 | End: 2024-03-02 | Stop reason: HOSPADM

## 2024-03-02 RX ORDER — METHOCARBAMOL 750 MG/1
750 TABLET, FILM COATED ORAL EVERY 8 HOURS PRN
Status: DISCONTINUED | OUTPATIENT
Start: 2024-03-02 | End: 2024-03-04

## 2024-03-02 RX ORDER — ONDANSETRON 4 MG/1
4 TABLET, ORALLY DISINTEGRATING ORAL EVERY 8 HOURS PRN
Status: DISCONTINUED | OUTPATIENT
Start: 2024-03-02 | End: 2024-03-02

## 2024-03-02 RX ORDER — DEXAMETHASONE SODIUM PHOSPHATE 4 MG/ML
INJECTION, SOLUTION INTRA-ARTICULAR; INTRALESIONAL; INTRAMUSCULAR; INTRAVENOUS; SOFT TISSUE PRN
Status: DISCONTINUED | OUTPATIENT
Start: 2024-03-02 | End: 2024-03-02 | Stop reason: SDUPTHER

## 2024-03-02 RX ORDER — GLYCOPYRROLATE 0.2 MG/ML
INJECTION INTRAMUSCULAR; INTRAVENOUS PRN
Status: DISCONTINUED | OUTPATIENT
Start: 2024-03-02 | End: 2024-03-02 | Stop reason: SDUPTHER

## 2024-03-02 RX ORDER — ACETAMINOPHEN 325 MG/1
650 TABLET ORAL EVERY 6 HOURS
Status: DISCONTINUED | OUTPATIENT
Start: 2024-03-02 | End: 2024-03-07 | Stop reason: HOSPADM

## 2024-03-02 RX ORDER — BUPIVACAINE HYDROCHLORIDE 5 MG/ML
INJECTION, SOLUTION EPIDURAL; INTRACAUDAL PRN
Status: DISCONTINUED | OUTPATIENT
Start: 2024-03-02 | End: 2024-03-02 | Stop reason: ALTCHOICE

## 2024-03-02 RX ORDER — HYDROXYZINE HYDROCHLORIDE 25 MG/1
50 TABLET, FILM COATED ORAL ONCE
Status: COMPLETED | OUTPATIENT
Start: 2024-03-02 | End: 2024-03-02

## 2024-03-02 RX ORDER — SUCCINYLCHOLINE/SOD CL,ISO/PF 100 MG/5ML
SYRINGE (ML) INTRAVENOUS PRN
Status: DISCONTINUED | OUTPATIENT
Start: 2024-03-02 | End: 2024-03-02 | Stop reason: SDUPTHER

## 2024-03-02 RX ORDER — DIAZEPAM 5 MG/1
10 TABLET ORAL EVERY 6 HOURS PRN
Status: DISCONTINUED | OUTPATIENT
Start: 2024-03-02 | End: 2024-03-07 | Stop reason: HOSPADM

## 2024-03-02 RX ORDER — REMIFENTANIL HYDROCHLORIDE 1 MG/ML
INJECTION, POWDER, LYOPHILIZED, FOR SOLUTION INTRAVENOUS CONTINUOUS PRN
Status: DISCONTINUED | OUTPATIENT
Start: 2024-03-02 | End: 2024-03-02

## 2024-03-02 RX ORDER — OXYCODONE HYDROCHLORIDE 5 MG/1
5 TABLET ORAL EVERY 4 HOURS PRN
Status: DISCONTINUED | OUTPATIENT
Start: 2024-03-02 | End: 2024-03-07 | Stop reason: HOSPADM

## 2024-03-02 RX ORDER — HYDROMORPHONE HYDROCHLORIDE 1 MG/ML
0.25 INJECTION, SOLUTION INTRAMUSCULAR; INTRAVENOUS; SUBCUTANEOUS EVERY 5 MIN PRN
Status: DISCONTINUED | OUTPATIENT
Start: 2024-03-02 | End: 2024-03-02 | Stop reason: HOSPADM

## 2024-03-02 RX ORDER — PROCHLORPERAZINE EDISYLATE 5 MG/ML
5 INJECTION INTRAMUSCULAR; INTRAVENOUS
Status: DISCONTINUED | OUTPATIENT
Start: 2024-03-02 | End: 2024-03-02 | Stop reason: HOSPADM

## 2024-03-02 RX ORDER — PROPOFOL 10 MG/ML
INJECTION, EMULSION INTRAVENOUS CONTINUOUS PRN
Status: DISCONTINUED | OUTPATIENT
Start: 2024-03-02 | End: 2024-03-02 | Stop reason: SDUPTHER

## 2024-03-02 RX ORDER — ENOXAPARIN SODIUM 100 MG/ML
40 INJECTION SUBCUTANEOUS 2 TIMES DAILY
Status: DISCONTINUED | OUTPATIENT
Start: 2024-03-03 | End: 2024-03-07 | Stop reason: HOSPADM

## 2024-03-02 RX ORDER — ONDANSETRON 2 MG/ML
4 INJECTION INTRAMUSCULAR; INTRAVENOUS EVERY 6 HOURS PRN
Status: DISCONTINUED | OUTPATIENT
Start: 2024-03-02 | End: 2024-03-02

## 2024-03-02 RX ORDER — HYDROMORPHONE HYDROCHLORIDE 1 MG/ML
0.25 INJECTION, SOLUTION INTRAMUSCULAR; INTRAVENOUS; SUBCUTANEOUS
Status: DISCONTINUED | OUTPATIENT
Start: 2024-03-02 | End: 2024-03-07 | Stop reason: HOSPADM

## 2024-03-02 RX ORDER — MAGNESIUM HYDROXIDE 1200 MG/15ML
LIQUID ORAL CONTINUOUS PRN
Status: DISCONTINUED | OUTPATIENT
Start: 2024-03-02 | End: 2024-03-02 | Stop reason: HOSPADM

## 2024-03-02 RX ORDER — HYDROMORPHONE HYDROCHLORIDE 1 MG/ML
0.5 INJECTION, SOLUTION INTRAMUSCULAR; INTRAVENOUS; SUBCUTANEOUS
Status: DISCONTINUED | OUTPATIENT
Start: 2024-03-02 | End: 2024-03-07 | Stop reason: HOSPADM

## 2024-03-02 RX ORDER — HYDROMORPHONE HYDROCHLORIDE 1 MG/ML
0.5 INJECTION, SOLUTION INTRAMUSCULAR; INTRAVENOUS; SUBCUTANEOUS EVERY 5 MIN PRN
Status: DISCONTINUED | OUTPATIENT
Start: 2024-03-02 | End: 2024-03-02 | Stop reason: HOSPADM

## 2024-03-02 RX ORDER — SODIUM CHLORIDE 9 MG/ML
INJECTION, SOLUTION INTRAVENOUS CONTINUOUS PRN
Status: DISCONTINUED | OUTPATIENT
Start: 2024-03-02 | End: 2024-03-02 | Stop reason: SDUPTHER

## 2024-03-02 RX ORDER — SODIUM CHLORIDE 0.9 % (FLUSH) 0.9 %
5-40 SYRINGE (ML) INJECTION PRN
Status: DISCONTINUED | OUTPATIENT
Start: 2024-03-02 | End: 2024-03-02 | Stop reason: HOSPADM

## 2024-03-02 RX ORDER — SODIUM CHLORIDE 9 MG/ML
INJECTION, SOLUTION INTRAVENOUS CONTINUOUS
Status: DISCONTINUED | OUTPATIENT
Start: 2024-03-02 | End: 2024-03-04

## 2024-03-02 RX ORDER — ONDANSETRON 2 MG/ML
INJECTION INTRAMUSCULAR; INTRAVENOUS PRN
Status: DISCONTINUED | OUTPATIENT
Start: 2024-03-02 | End: 2024-03-02 | Stop reason: SDUPTHER

## 2024-03-02 RX ORDER — PROPOFOL 10 MG/ML
INJECTION, EMULSION INTRAVENOUS PRN
Status: DISCONTINUED | OUTPATIENT
Start: 2024-03-02 | End: 2024-03-02 | Stop reason: SDUPTHER

## 2024-03-02 RX ORDER — SODIUM CHLORIDE 9 MG/ML
INJECTION, SOLUTION INTRAVENOUS PRN
Status: DISCONTINUED | OUTPATIENT
Start: 2024-03-02 | End: 2024-03-02 | Stop reason: HOSPADM

## 2024-03-02 RX ORDER — OXYCODONE HYDROCHLORIDE 5 MG/1
10 TABLET ORAL EVERY 4 HOURS PRN
Status: DISCONTINUED | OUTPATIENT
Start: 2024-03-02 | End: 2024-03-07 | Stop reason: HOSPADM

## 2024-03-02 RX ORDER — DIPHENHYDRAMINE HYDROCHLORIDE 50 MG/ML
12.5 INJECTION INTRAMUSCULAR; INTRAVENOUS
Status: DISCONTINUED | OUTPATIENT
Start: 2024-03-02 | End: 2024-03-02 | Stop reason: HOSPADM

## 2024-03-02 RX ORDER — SODIUM CHLORIDE 0.9 % (FLUSH) 0.9 %
5-40 SYRINGE (ML) INJECTION EVERY 12 HOURS SCHEDULED
Status: DISCONTINUED | OUTPATIENT
Start: 2024-03-02 | End: 2024-03-02 | Stop reason: HOSPADM

## 2024-03-02 RX ORDER — SODIUM CHLORIDE 0.9 % (FLUSH) 0.9 %
5-40 SYRINGE (ML) INJECTION PRN
Status: DISCONTINUED | OUTPATIENT
Start: 2024-03-02 | End: 2024-03-07 | Stop reason: HOSPADM

## 2024-03-02 RX ORDER — FENTANYL CITRATE 50 UG/ML
INJECTION, SOLUTION INTRAMUSCULAR; INTRAVENOUS PRN
Status: DISCONTINUED | OUTPATIENT
Start: 2024-03-02 | End: 2024-03-02 | Stop reason: SDUPTHER

## 2024-03-02 RX ORDER — SODIUM CHLORIDE 0.9 % (FLUSH) 0.9 %
5-40 SYRINGE (ML) INJECTION EVERY 12 HOURS SCHEDULED
Status: DISCONTINUED | OUTPATIENT
Start: 2024-03-02 | End: 2024-03-07 | Stop reason: HOSPADM

## 2024-03-02 RX ORDER — MEPERIDINE HYDROCHLORIDE 25 MG/ML
12.5 INJECTION INTRAMUSCULAR; INTRAVENOUS; SUBCUTANEOUS EVERY 5 MIN PRN
Status: DISCONTINUED | OUTPATIENT
Start: 2024-03-02 | End: 2024-03-02 | Stop reason: HOSPADM

## 2024-03-02 RX ORDER — SODIUM CHLORIDE 9 MG/ML
INJECTION, SOLUTION INTRAVENOUS PRN
Status: DISCONTINUED | OUTPATIENT
Start: 2024-03-02 | End: 2024-03-07 | Stop reason: HOSPADM

## 2024-03-02 RX ORDER — CEFAZOLIN SODIUM 1 G/3ML
INJECTION, POWDER, FOR SOLUTION INTRAMUSCULAR; INTRAVENOUS PRN
Status: DISCONTINUED | OUTPATIENT
Start: 2024-03-02 | End: 2024-03-02 | Stop reason: SDUPTHER

## 2024-03-02 RX ORDER — ROCURONIUM BROMIDE 10 MG/ML
INJECTION, SOLUTION INTRAVENOUS PRN
Status: DISCONTINUED | OUTPATIENT
Start: 2024-03-02 | End: 2024-03-02 | Stop reason: SDUPTHER

## 2024-03-02 RX ORDER — SODIUM CHLORIDE, SODIUM LACTATE, POTASSIUM CHLORIDE, CALCIUM CHLORIDE 600; 310; 30; 20 MG/100ML; MG/100ML; MG/100ML; MG/100ML
INJECTION, SOLUTION INTRAVENOUS CONTINUOUS PRN
Status: DISCONTINUED | OUTPATIENT
Start: 2024-03-02 | End: 2024-03-02 | Stop reason: SDUPTHER

## 2024-03-02 RX ORDER — VANCOMYCIN HYDROCHLORIDE 1 G/20ML
INJECTION, POWDER, LYOPHILIZED, FOR SOLUTION INTRAVENOUS PRN
Status: DISCONTINUED | OUTPATIENT
Start: 2024-03-02 | End: 2024-03-02 | Stop reason: ALTCHOICE

## 2024-03-02 RX ADMIN — SODIUM CHLORIDE, PRESERVATIVE FREE 10 ML: 5 INJECTION INTRAVENOUS at 19:39

## 2024-03-02 RX ADMIN — DEXAMETHASONE SODIUM PHOSPHATE 4 MG: 4 INJECTION INTRA-ARTICULAR; INTRALESIONAL; INTRAMUSCULAR; INTRAVENOUS; SOFT TISSUE at 18:10

## 2024-03-02 RX ADMIN — REMIFENTANIL HYDROCHLORIDE 0.1 MCG/KG/MIN: 1 INJECTION, POWDER, LYOPHILIZED, FOR SOLUTION INTRAVENOUS at 12:30

## 2024-03-02 RX ADMIN — HYDROMORPHONE HYDROCHLORIDE 0.5 MG: 1 INJECTION, SOLUTION INTRAMUSCULAR; INTRAVENOUS; SUBCUTANEOUS at 18:15

## 2024-03-02 RX ADMIN — PANTOPRAZOLE SODIUM 40 MG: 40 TABLET, DELAYED RELEASE ORAL at 05:46

## 2024-03-02 RX ADMIN — OXYCODONE 10 MG: 5 TABLET ORAL at 16:42

## 2024-03-02 RX ADMIN — ACETAMINOPHEN 650 MG: 325 TABLET ORAL at 21:50

## 2024-03-02 RX ADMIN — GLYCOPYRROLATE 0.4 MG: 0.2 INJECTION INTRAMUSCULAR; INTRAVENOUS at 11:00

## 2024-03-02 RX ADMIN — SUGAMMADEX 200 MG: 100 INJECTION, SOLUTION INTRAVENOUS at 12:22

## 2024-03-02 RX ADMIN — HYDROMORPHONE HYDROCHLORIDE 0.5 MG: 1 INJECTION, SOLUTION INTRAMUSCULAR; INTRAVENOUS; SUBCUTANEOUS at 14:55

## 2024-03-02 RX ADMIN — FENTANYL CITRATE 50 MCG: 50 INJECTION, SOLUTION INTRAMUSCULAR; INTRAVENOUS at 13:44

## 2024-03-02 RX ADMIN — SODIUM CHLORIDE, SODIUM LACTATE, POTASSIUM CHLORIDE, AND CALCIUM CHLORIDE: .6; .31; .03; .02 INJECTION, SOLUTION INTRAVENOUS at 10:47

## 2024-03-02 RX ADMIN — DEXAMETHASONE SODIUM PHOSPHATE 4 MG: 4 INJECTION INTRA-ARTICULAR; INTRALESIONAL; INTRAMUSCULAR; INTRAVENOUS; SOFT TISSUE at 00:04

## 2024-03-02 RX ADMIN — Medication 200 MG: at 10:54

## 2024-03-02 RX ADMIN — ROCURONIUM BROMIDE 55 MG: 10 INJECTION, SOLUTION INTRAVENOUS at 11:20

## 2024-03-02 RX ADMIN — SODIUM CHLORIDE: 9 INJECTION, SOLUTION INTRAVENOUS at 16:45

## 2024-03-02 RX ADMIN — DIAZEPAM 10 MG: 5 TABLET ORAL at 19:39

## 2024-03-02 RX ADMIN — REMIFENTANIL HYDROCHLORIDE 0.1 MCG/KG/MIN: 1 INJECTION, POWDER, LYOPHILIZED, FOR SOLUTION INTRAVENOUS at 10:57

## 2024-03-02 RX ADMIN — METHOCARBAMOL 1000 MG: 500 TABLET ORAL at 21:49

## 2024-03-02 RX ADMIN — SODIUM CHLORIDE 3000 MG: 900 INJECTION INTRAVENOUS at 19:44

## 2024-03-02 RX ADMIN — ONDANSETRON 4 MG: 2 INJECTION INTRAMUSCULAR; INTRAVENOUS at 13:38

## 2024-03-02 RX ADMIN — PROPOFOL 120 MCG/KG/MIN: 10 INJECTION, EMULSION INTRAVENOUS at 10:56

## 2024-03-02 RX ADMIN — ACETAMINOPHEN 650 MG: 325 TABLET ORAL at 18:10

## 2024-03-02 RX ADMIN — CEFAZOLIN SODIUM 3 G: 1 POWDER, FOR SOLUTION INTRAMUSCULAR; INTRAVENOUS at 11:11

## 2024-03-02 RX ADMIN — METHOCARBAMOL 1000 MG: 500 TABLET ORAL at 00:04

## 2024-03-02 RX ADMIN — HYDROMORPHONE HYDROCHLORIDE 0.5 MG: 1 INJECTION, SOLUTION INTRAMUSCULAR; INTRAVENOUS; SUBCUTANEOUS at 15:16

## 2024-03-02 RX ADMIN — SODIUM CHLORIDE: 9 INJECTION, SOLUTION INTRAVENOUS at 10:47

## 2024-03-02 RX ADMIN — PROPOFOL 200 MG: 10 INJECTION, EMULSION INTRAVENOUS at 10:52

## 2024-03-02 RX ADMIN — HYDROMORPHONE HYDROCHLORIDE 0.5 MG: 1 INJECTION, SOLUTION INTRAMUSCULAR; INTRAVENOUS; SUBCUTANEOUS at 21:50

## 2024-03-02 RX ADMIN — METHOCARBAMOL 1000 MG: 500 TABLET ORAL at 05:46

## 2024-03-02 RX ADMIN — DEXAMETHASONE SODIUM PHOSPHATE 4 MG: 4 INJECTION INTRA-ARTICULAR; INTRALESIONAL; INTRAMUSCULAR; INTRAVENOUS; SOFT TISSUE at 05:46

## 2024-03-02 RX ADMIN — ONDANSETRON 4 MG: 4 TABLET, ORALLY DISINTEGRATING ORAL at 21:12

## 2024-03-02 RX ADMIN — FENTANYL CITRATE 50 MCG: 50 INJECTION, SOLUTION INTRAMUSCULAR; INTRAVENOUS at 10:52

## 2024-03-02 RX ADMIN — METHOCARBAMOL 1000 MG: 100 INJECTION INTRAMUSCULAR; INTRAVENOUS at 15:00

## 2024-03-02 RX ADMIN — ROCURONIUM BROMIDE 5 MG: 10 INJECTION, SOLUTION INTRAVENOUS at 10:52

## 2024-03-02 RX ADMIN — HYDROXYZINE HYDROCHLORIDE 50 MG: 25 TABLET ORAL at 03:02

## 2024-03-02 RX ADMIN — DEXAMETHASONE SODIUM PHOSPHATE 4 MG: 4 INJECTION INTRA-ARTICULAR; INTRALESIONAL; INTRAMUSCULAR; INTRAVENOUS; SOFT TISSUE at 10:52

## 2024-03-02 RX ADMIN — OXYCODONE 10 MG: 5 TABLET ORAL at 20:34

## 2024-03-02 ASSESSMENT — PAIN DESCRIPTION - PAIN TYPE
TYPE: SURGICAL PAIN
TYPE: ACUTE PAIN;SURGICAL PAIN
TYPE: ACUTE PAIN;SURGICAL PAIN

## 2024-03-02 ASSESSMENT — PAIN SCALES - GENERAL
PAINLEVEL_OUTOF10: 7
PAINLEVEL_OUTOF10: 4
PAINLEVEL_OUTOF10: 4
PAINLEVEL_OUTOF10: 5
PAINLEVEL_OUTOF10: 7
PAINLEVEL_OUTOF10: 8
PAINLEVEL_OUTOF10: 6
PAINLEVEL_OUTOF10: 4

## 2024-03-02 ASSESSMENT — PAIN DESCRIPTION - DESCRIPTORS
DESCRIPTORS: SORE
DESCRIPTORS: SORE
DESCRIPTORS: ACHING;DISCOMFORT
DESCRIPTORS: ACHING;DISCOMFORT

## 2024-03-02 ASSESSMENT — PAIN - FUNCTIONAL ASSESSMENT
PAIN_FUNCTIONAL_ASSESSMENT: PREVENTS OR INTERFERES SOME ACTIVE ACTIVITIES AND ADLS

## 2024-03-02 ASSESSMENT — PAIN DESCRIPTION - FREQUENCY
FREQUENCY: CONTINUOUS

## 2024-03-02 ASSESSMENT — PAIN DESCRIPTION - LOCATION
LOCATION: BACK

## 2024-03-02 ASSESSMENT — PAIN DESCRIPTION - ORIENTATION
ORIENTATION: MID;UPPER
ORIENTATION: MID;UPPER
ORIENTATION: UPPER
ORIENTATION: MID;UPPER
ORIENTATION: UPPER

## 2024-03-02 ASSESSMENT — PAIN DESCRIPTION - ONSET
ONSET: ON-GOING
ONSET: ON-GOING

## 2024-03-02 NOTE — PROGRESS NOTES
4 Eyes Skin Assessment     NAME:  Richardson Vargas  YOB: 1970  MEDICAL RECORD NUMBER:  [id number]    The patient is being assessed for  Admission    I agree that at least one RN has performed a thorough Head to Toe Skin Assessment on the patient. ALL assessment sites listed below have been assessed.      Areas assessed by both nurses:    Head, Face, Ears, Shoulders, Back, Chest, Arms, Elbows, Hands, Sacrum. Buttock, Coccyx, Ischium, Legs. Feet and Heels, and Under Medical Devices         Does the Patient have a Wound? No noted wound(s)       Eric Prevention initiated by RN: No  Wound Care Orders initiated by RN: No    Pressure Injury (Stage 3,4, Unstageable, DTI, NWPT, and Complex wounds) if present, place Wound referral order by RN under : No    New Ostomies, if present place, Ostomy referral order under : No     Nurse 1 eSignature: Electronically signed by Lubna Walker RN on 3/2/24 at 3:30 AM EST    **SHARE this note so that the co-signing nurse can place an eSignature**    Nurse 2 eSignature: Electronically signed by Ivana Benson RN on 3/2/24 at 4:22 AM EST

## 2024-03-02 NOTE — PROGRESS NOTES
Pt admitted to 5513 from ED. Stood at bedside, unable to ambulate at this time due to leg weakness. Pt voids via urinal and is continent. A&Ox4. Swallow screen completed. 4 eyes completed. Denies pain, however c/o muscle tightness and spasms, medicated with Robaxin per MAR. Regular, carb controlled diet. NPO since 0000. Standard safety measures in place. WCTM.
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
## 2024-03-02 NOTE — PROGRESS NOTES
PACU Transfer Note    Vitals:    03/02/24 1600   BP: (!) 168/91   Pulse: 67   Resp: 13   Temp: 98.1 °F (36.7 °C)   SpO2: 97%   BP is within 20% of baseline value.    In: 2035 [P.O.:120; I.V.:1915]  Out: 1070 [Urine:800; Drains:70]    Pain assessment:  present - adequately treated  Pain Level: 4    Report given to Receiving unit RN, April here at bedside in the PACU. Transferred back to room 5513 in bed per in house transportAndrea. Wife has been updated and directed back to room.    3/2/2024 4:10 PM

## 2024-03-02 NOTE — PROGRESS NOTES
Patient received from the OR to PACU #13 post THORACIC 5 TO THORACIC 7 LAMINECTOMY WITH DECOMPRESSION of Dr. Garvin. Placed on PACU monitoring equipment. Report given per CRNA and OR RN. Per report, patient was stable during the procedure. On arrival, patient is still asleep from surgery with no evidence of pain. Oral airway in place, no distress.
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
## 2024-03-02 NOTE — PROGRESS NOTES
Neurosurgery Progress Note    3/2/2024 9:50 AM                               Richardson GARZA Alicia                      LOS: 1 day               Patient is a 52 y/o M w/ progressively worsening bilateral LE numbness, weakness, difficulty walking. MRI of thoracic spine shows severe stenosis w/ cord compression / cord signal change at T5   Subjective:  No acute events overnight. Patient has no specific complaints this am.                                                 Physical Exam:    Temp (24hrs), Av.8 °F (36.6 °C), Min:97.5 °F (36.4 °C), Max:98.3 °F (36.8 °C)  Sustained clonus BLE     Neuro Exam  Motor Exam:    R  L    Deltoid 5 5   Biceps 5 5   Triceps 5 5   Wrist extension  5 5   Interossei 5 5        R  L    Hip flexion  4 5   Hip extension  4 5   Knee flexion  4 5   Knee extension  4 5   Ankle dorsiflexion  5 5   Ankle plantar flexion  5 5         Sensory: decreased sensation starting around 1-2 inches below nipple level - this extends down to feet  Denies any tingling sensation   Has difficultly ambulating has been walking w/ cane, has not fallen   Cerebellar/coordination: no ataxia in BUE  Tone: normal in all 4 extremities      Labs:  Recent Labs     24  0751   WBC 11.8*   HGB 15.5   HCT 47.3          Recent Labs     24  0751      K 4.7      CO2 21   BUN 13   CREATININE 0.9   GLUCOSE 173*   CALCIUM 9.1   MG 2.30       No results for input(s): \"PROTIME\", \"INR\", \"APTT\" in the last 72 hours.      Patient is a 52 y/o M w/ progressively worsening bilateral LE numbness, weakness, difficulty walking. MRI of thoracic spine shows severe stenosis w/ cord compression / cord signal change at T5    Pt will undergo urgent T5-T7 laminectomy with possible fusion and fixation today   Images were reviewed with Dr. Dean and the radiologist. Initial MRI labeled the stenosis at T3 however after further review it was agreed this was in fact T5     Shalonda Yun

## 2024-03-02 NOTE — OP NOTE
appropriately and opened using a #15 blade knife, the skin was incised in the midline and monopolar cautery was used to dissect through the subcutaneous tissue to open the fascia and reflect the paraspinal muscles laterally exposing the T5-T7 interspaces. The stereotactic array was secured to the spinous process of T2 and the [employer] O-Arm CT was brought into the field to acquire imaging.      Using stereotactic guidance and anatomic landmarks, the T5 pedicle on the left side was verified for localization purposes. The rongeur was then used to remove the spinous process of T4-T7. A wide T4-T7 laminectomy was performed using the [employer] Sukh drill to create a trough along the lamina-facet junction. The lamins was contiguous with a severely thick and calcified ligamentum flavum. This was drilled away from the underlying dura, which was significantly compressed. A wide central decompression was accomplished. The hypertrophic ligamentum flavum was further removed with a 2 mm punch microsurgically and released from the underlying dura and exiting nerve roots.      A subfacial Hemovac drain was placed. The fascia was then reapproximated using interrupted 0 Vicryl sutures and interrupted 3-0 Vicryl sutures were used in the deep dermis. A 4-0 Monocryl was used to reapproximate the subcuticular layer and a derma-bond dressing was then applied.  The patient was extubated in the operating room and transferred to the recovery room in stable condition.  There were no complications. Monitoring remained stable throughout the operation.     In accordance with CMS guidelines, I attest that I was present for the entire procedure from the creation of the skin incision to the closure.     DRAINS: Medium hemovac drain in subfacial space

## 2024-03-02 NOTE — H&P
General: He is not in acute distress.     Appearance: He is not ill-appearing.   HENT:      Head: Normocephalic and atraumatic.      Mouth/Throat:      Mouth: Mucous membranes are moist.      Pharynx: Oropharynx is clear.   Eyes:      Extraocular Movements: Extraocular movements intact.      Conjunctiva/sclera: Conjunctivae normal.      Pupils: Pupils are equal, round, and reactive to light.   Cardiovascular:      Rate and Rhythm: Normal rate.      Pulses: Normal pulses.   Pulmonary:      Effort: Pulmonary effort is normal.      Breath sounds: Normal breath sounds.   Abdominal:      General: There is no distension.      Palpations: Abdomen is soft.   Musculoskeletal:         General: No swelling.   Skin:     General: Skin is warm and dry.   Neurological:      Mental Status: He is alert.      Comments: Pt with 4/5 muscle strength in bilateral LE, sensation intact in lower extremities and torso       Physical exam:       Vitals:    03/01/24 2245   BP:    Pulse: 77   Resp:    Temp:    SpO2: 98%       DATA:    Labs:  CBC: No results for input(s): \"WBC\", \"HGB\", \"HCT\", \"PLT\" in the last 72 hours.    BMP: No results for input(s): \"NA\", \"K\", \"CL\", \"CO2\", \"BUN\", \"CREATININE\", \"GLUCOSE\", \"CA\", \"PHOS\" in the last 72 hours.  LFT's: No results for input(s): \"AST\", \"ALT\", \"ALB\", \"BILITOT\", \"ALKPHOS\" in the last 72 hours.  Troponin: No results for input(s): \"TROPONINI\" in the last 72 hours.  BNP:No results for input(s): \"BNP\" in the last 72 hours.  ABGs: No results for input(s): \"PHART\", \"LSH7BSY\", \"PO2ART\" in the last 72 hours.  INR: No results for input(s): \"INR\" in the last 72 hours.    U/A:No results for input(s): \"NITRITE\", \"COLORU\", \"PHUR\", \"LABCAST\", \"WBCUA\", \"RBCUA\", \"MUCUS\", \"TRICHOMONAS\", \"YEAST\", \"BACTERIA\", \"CLARITYU\", \"SPECGRAV\", \"LEUKOCYTESUR\", \"UROBILINOGEN\", \"BILIRUBINUR\", \"BLOODU\", \"GLUCOSEU\", \"AMORPHOUS\" in the last 72 hours.    Invalid input(s): \"KETONESU\"    CT LUMBAR SPINE WO CONTRAST   Final Result      1.  Lumbar spondylosis. Moderate spinal canal stenosis as described above.   2. No acute fracture      Electronically signed by Maria Luisa Moody      MRI THORACIC SPINE WO CONTRAST   Final Result   CERVICAL SPINE:   1. Multilevel severe pre foraminal and neural foraminal stenosis, on the right at C3-4 and to a lesser degree at C2-3   2. Multilevel severe pre foraminal neural foraminal stenosis, on the left C5-C6 and C6-C7        THORACIC SPINE:   1. Cord myelopathy and severe stenosis with posterior hypertrophic changes impinging the spinal cord with anterior displacement at the T3 level   2. Posterior ligamentous hypertrophy present at T4 and T4-T5 level as described above   2. Further evaluation of the thoracic spinal computed tomography is suggested to assess the degree of posterior ligamentous calcification (OPLL, ossification of posterior longitudinal ligament) versus ligamentous soft tissue hypertrophy.          Electronically signed by oDnell Riggs MD      MRI CERVICAL SPINE WO CONTRAST   Final Result   CERVICAL SPINE:   1. Multilevel severe pre foraminal and neural foraminal stenosis, on the right at C3-4 and to a lesser degree at C2-3   2. Multilevel severe pre foraminal neural foraminal stenosis, on the left C5-C6 and C6-C7        THORACIC SPINE:   1. Cord myelopathy and severe stenosis with posterior hypertrophic changes impinging the spinal cord with anterior displacement at the T3 level   2. Posterior ligamentous hypertrophy present at T4 and T4-T5 level as described above   2. Further evaluation of the thoracic spinal computed tomography is suggested to assess the degree of posterior ligamentous calcification (OPLL, ossification of posterior longitudinal ligament) versus ligamentous soft tissue hypertrophy.          Electronically signed by Donell Riggs MD          ASSESSMENT AND PLAN:  Richardson Vargas is a 53 yoM with a PMHx of HTN, GERD, BERNADINE, low back pain here for progressive bilateral LE

## 2024-03-02 NOTE — ANESTHESIA POSTPROCEDURE EVALUATION
Department of Anesthesiology  Postprocedure Note    Patient: Richardson Vargas  MRN: [id number]  YOB: 1970  Date of evaluation: 3/2/2024    Procedure Summary       Date: 03/02/24 Room / Location: [address]    Anesthesia Start: 1048 Anesthesia Stop: 1420    Procedure: THORACIC 5 TO THORACIC 7 LAMINECTOMY WITH DECOMPRESSION (Back) Diagnosis:       Paralysis (HCC)      (Paralysis (HCC) [G83.9])    Surgeons: Bebeto Dean MD Responsible Provider: Terry Elliott DO    Anesthesia Type: general ASA Status: 4 - Emergent            Anesthesia Type: No value filed.    Chris Phase I: Chris Score: 5    Chris Phase II:      Anesthesia Post Evaluation    Patient location during evaluation: PACU  Patient participation: complete - patient participated  Level of consciousness: awake and alert  Pain score: 3  Airway patency: patent  Nausea & Vomiting: no nausea and no vomiting  Cardiovascular status: hemodynamically stable  Respiratory status: acceptable  Hydration status: stable  Pain management: adequate and satisfactory to patient    There were no known notable events for this encounter.

## 2024-03-02 NOTE — PROGRESS NOTES
Internal Medicine Progress Note    Date: 3/2/2024   Patient: Richardson Vargas   Hospital Day: 1      CC: Gait Problem (Pt coming from [facility] for emergent MRI d/t spinal compression that is causing the pt to lose feeling in both of his legs. States that he got MRI of just lumbar spine and they recommended full spine MRI today. )       Interval Hx   Patient seen and examined this AM. Patient reports doing well.   Vitals stable, SBP in 140's; patient has a history of hypertension and his BP runs in the 140's at home. EKG reviewed, sinus bradycardia.     Labs reviewed stable. WBC slightly elevated, 11.8, likely reactive.   Plan for neurosurgical intervention today.         HPI: Richardson Vargas is a 53 yoM with a PMHx of HTN, GERD, BERNADINE, low back pain here for progressive bilateral LE weakness over the past month, associated with decreased ability to initiate urination and some subjective sensory loss from mid-torso distally. He reports associated sensation that he is wearing compression clothing in those regions. He says he has not tried anything for his sx and has not noticed any particular position that would relieve his sx. Pt now uses a cane 2/2 weakness. Saw outpt [facility] today who recommended to come for emergent MRI for concerns of cord myelopathy. Pt also endorses lowe back pain that was midline, mild and slightly worse since seeing a chiropractor at the beginning of the month for his sx.     The pt presented to [facility] one month ago for sx of difficulty and initial MRI at [facility] 2/15 that did not show any definitive issues that could be causing weakness at that time.     ED Course:  INITIAL VITALS: BP: (!) 179/83, Temp: 98.3 °F (36.8 °C), Pulse: 61, Respirations: 16, SpO2: 98 %    NSGY consulted, pt sent for imaging, made NPO at midnight. No other labs or tests done at that time.  MRI CERVICAL SPINE: Multilevel severe pre foraminal and neural foraminal stenosis, on the right at C3-4  and to a lesser degree at C2-3. Multilevel severe pre foraminal neural foraminal stenosis, on the left C5-C6 and C6-C7    MRI THORACIC SPINE: Cord myelopathy and severe stenosis with posterior hypertrophic changes impinging the spinal cord with anterior displacement at the T3 level. Posterior ligamentous hypertrophy present at T4 and T4-T5 level as described above. Further evaluation of the thoracic spinal computed tomography is suggested to assess the degree of posterior ligamentous calcification (OPLL, ossification of posterior longitudinal ligament) versus ligamentous soft tissue hypertrophy.         Objective     Vital Signs:  Patient Vitals for the past 8 hrs:   BP Temp Temp src Pulse Resp SpO2   03/02/24 0306 130/78 97.7 °F (36.5 °C) Axillary 57 18 95 %       Physical Exam  Constitutional:       General: He is not in acute distress.     Appearance: He is obese. He is ill-appearing. He is not diaphoretic.   HENT:      Head: Normocephalic and atraumatic.      Nose: Nose normal.      Mouth/Throat:      Mouth: Mucous membranes are moist.   Cardiovascular:      Rate and Rhythm: Normal rate and regular rhythm.      Heart sounds: Normal heart sounds.   Pulmonary:      Effort: Pulmonary effort is normal. No respiratory distress.      Breath sounds: Normal breath sounds. No wheezing or rales.   Abdominal:      General: Abdomen is flat. There is no distension.      Palpations: Abdomen is soft.      Tenderness: There is no abdominal tenderness.   Musculoskeletal:      Right lower leg: No edema.      Left lower leg: No edema.   Neurological:      Mental Status: He is alert and oriented to person, place, and time.      Cranial Nerves: No cranial nerve deficit.      Sensory: No sensory deficit.      Motor: Weakness (4/5 BLE, 5/5 BUE) present.      Deep Tendon Reflexes: Reflexes abnormal (Babinski; upgoing toes. reflexes 1+).      Comments: Increased muscle tone in BLE            Labs:  CBC: No results for input(s): \"WBC\",

## 2024-03-02 NOTE — ED NOTES
Pt out of the room at [facility]     Lyudmila George, TALYA  03/01/24 7221    
Level: Oriented X4  NIH Score:    C-SSRS: Risk of Suicide: No Risk  Bedside swallow:    [place] Coma Scale (GCS): [place] Coma Scale  Eye Opening: Spontaneous  Best Verbal Response: Oriented  Best Motor Response: Obeys commands  [place] Coma Scale Score: 15  Active LDA's:    PO Status: Regular  Pertinent or High Risk Medications/Drips: no   If Yes, please provide details:   Pending Blood Product Administration: no       You may also review the ED PT Care Timeline found under the Summary Nursing Index tab.    Recommendation    Pending orders: see signed and held  Plan for Discharge (if known):   Additional Comments: pt coming from [facility] for emergent MRI when compression in spinal cord was found there today. After MRIs here found myelopathy of throacic spine. A&Ox4. On RA. Able to stand and pivot from wheelchair to bed.    If any further questions, please call Sending RN at [phone]    Electronically signed by: Electronically signed by Jose Leonardo RN on 3/1/2024 at 10:29 PM      Jose Leonardo RN  03/01/24 [id number]

## 2024-03-02 NOTE — PLAN OF CARE
Problem: Safety - Adult  Goal: Free from fall injury  Outcome: Progressing  Note: Standard safety measures in place. Pt oriented to new room. Call light in reach. Verbalizes understanding to call RN before getting OOB.      Problem: Pain  Goal: Verbalizes/displays adequate comfort level or baseline comfort level  Outcome: Progressing  Note: Pt endorses muscle spasms and tightness. Declines pain medication, however requests muscle relaxer. Pt medicated per MAR.

## 2024-03-03 LAB
ANION GAP SERPL CALCULATED.3IONS-SCNC: 12 MMOL/L (ref 3–16)
BASOPHILS # BLD: 0 K/UL (ref 0–0.2)
BASOPHILS NFR BLD: 0.1 %
BUN SERPL-MCNC: 13 MG/DL (ref 7–20)
CALCIUM SERPL-MCNC: 8.5 MG/DL (ref 8.3–10.6)
CHLORIDE SERPL-SCNC: 103 MMOL/L (ref 99–110)
CO2 SERPL-SCNC: 22 MMOL/L (ref 21–32)
CREAT SERPL-MCNC: 1 MG/DL (ref 0.9–1.3)
DEPRECATED RDW RBC AUTO: 15.1 % (ref 12.4–15.4)
EOSINOPHIL # BLD: 0 K/UL (ref 0–0.6)
EOSINOPHIL NFR BLD: 0 %
GFR SERPLBLD CREATININE-BSD FMLA CKD-EPI: >60 ML/MIN/{1.73_M2}
GLUCOSE SERPL-MCNC: 186 MG/DL (ref 70–99)
HCT VFR BLD AUTO: 43.3 % (ref 40.5–52.5)
HGB BLD-MCNC: 14.1 G/DL (ref 13.5–17.5)
LYMPHOCYTES # BLD: 1.2 K/UL (ref 1–5.1)
LYMPHOCYTES NFR BLD: 4.4 %
MAGNESIUM SERPL-MCNC: 2.2 MG/DL (ref 1.8–2.4)
MCH RBC QN AUTO: 28.4 PG (ref 26–34)
MCHC RBC AUTO-ENTMCNC: 32.6 G/DL (ref 31–36)
MCV RBC AUTO: 86.9 FL (ref 80–100)
MONOCYTES # BLD: 1.1 K/UL (ref 0–1.3)
MONOCYTES NFR BLD: 4.2 %
NEUTROPHILS # BLD: 24.5 K/UL (ref 1.7–7.7)
NEUTROPHILS NFR BLD: 91.3 %
PLATELET # BLD AUTO: 229 K/UL (ref 135–450)
PMV BLD AUTO: 8.7 FL (ref 5–10.5)
POTASSIUM SERPL-SCNC: 4.7 MMOL/L (ref 3.5–5.1)
RBC # BLD AUTO: 4.98 M/UL (ref 4.2–5.9)
SODIUM SERPL-SCNC: 137 MMOL/L (ref 136–145)
WBC # BLD AUTO: 26.9 K/UL (ref 4–11)

## 2024-03-03 PROCEDURE — 85025 COMPLETE CBC W/AUTO DIFF WBC: CPT

## 2024-03-03 PROCEDURE — 97116 GAIT TRAINING THERAPY: CPT

## 2024-03-03 PROCEDURE — 97166 OT EVAL MOD COMPLEX 45 MIN: CPT

## 2024-03-03 PROCEDURE — 6360000002 HC RX W HCPCS: Performed by: NEUROLOGICAL SURGERY

## 2024-03-03 PROCEDURE — 2060000000 HC ICU INTERMEDIATE R&B

## 2024-03-03 PROCEDURE — 97530 THERAPEUTIC ACTIVITIES: CPT

## 2024-03-03 PROCEDURE — 94660 CPAP INITIATION&MGMT: CPT

## 2024-03-03 PROCEDURE — 2580000003 HC RX 258: Performed by: NEUROLOGICAL SURGERY

## 2024-03-03 PROCEDURE — 6370000000 HC RX 637 (ALT 250 FOR IP): Performed by: PHYSICIAN ASSISTANT

## 2024-03-03 PROCEDURE — 6360000002 HC RX W HCPCS: Performed by: NURSE PRACTITIONER

## 2024-03-03 PROCEDURE — 83735 ASSAY OF MAGNESIUM: CPT

## 2024-03-03 PROCEDURE — 80048 BASIC METABOLIC PNL TOTAL CA: CPT

## 2024-03-03 PROCEDURE — 97162 PT EVAL MOD COMPLEX 30 MIN: CPT

## 2024-03-03 PROCEDURE — 2580000003 HC RX 258

## 2024-03-03 PROCEDURE — 6370000000 HC RX 637 (ALT 250 FOR IP)

## 2024-03-03 PROCEDURE — 6370000000 HC RX 637 (ALT 250 FOR IP): Performed by: NURSE PRACTITIONER

## 2024-03-03 PROCEDURE — 6360000002 HC RX W HCPCS: Performed by: PHYSICIAN ASSISTANT

## 2024-03-03 PROCEDURE — 2580000003 HC RX 258: Performed by: PHYSICIAN ASSISTANT

## 2024-03-03 PROCEDURE — 36415 COLL VENOUS BLD VENIPUNCTURE: CPT

## 2024-03-03 RX ORDER — DEXAMETHASONE 4 MG/1
4 TABLET ORAL EVERY 8 HOURS SCHEDULED
Status: COMPLETED | OUTPATIENT
Start: 2024-03-04 | End: 2024-03-05

## 2024-03-03 RX ORDER — DEXAMETHASONE 4 MG/1
4 TABLET ORAL EVERY 6 HOURS SCHEDULED
Status: COMPLETED | OUTPATIENT
Start: 2024-03-03 | End: 2024-03-04

## 2024-03-03 RX ORDER — DEXAMETHASONE 4 MG/1
4 TABLET ORAL EVERY 12 HOURS SCHEDULED
Status: COMPLETED | OUTPATIENT
Start: 2024-03-05 | End: 2024-03-06

## 2024-03-03 RX ORDER — DEXAMETHASONE 4 MG/1
4 TABLET ORAL DAILY
Status: COMPLETED | OUTPATIENT
Start: 2024-03-07 | End: 2024-03-07

## 2024-03-03 RX ADMIN — OXYCODONE 10 MG: 5 TABLET ORAL at 20:58

## 2024-03-03 RX ADMIN — SODIUM CHLORIDE, PRESERVATIVE FREE 10 ML: 5 INJECTION INTRAVENOUS at 21:01

## 2024-03-03 RX ADMIN — OXYCODONE 10 MG: 5 TABLET ORAL at 04:42

## 2024-03-03 RX ADMIN — ACETAMINOPHEN 650 MG: 325 TABLET ORAL at 16:50

## 2024-03-03 RX ADMIN — DEXAMETHASONE SODIUM PHOSPHATE 4 MG: 4 INJECTION INTRA-ARTICULAR; INTRALESIONAL; INTRAMUSCULAR; INTRAVENOUS; SOFT TISSUE at 00:43

## 2024-03-03 RX ADMIN — PANTOPRAZOLE SODIUM 40 MG: 40 TABLET, DELAYED RELEASE ORAL at 06:39

## 2024-03-03 RX ADMIN — SODIUM CHLORIDE 3000 MG: 900 INJECTION INTRAVENOUS at 03:37

## 2024-03-03 RX ADMIN — DEXAMETHASONE 4 MG: 4 TABLET ORAL at 23:04

## 2024-03-03 RX ADMIN — SODIUM CHLORIDE, PRESERVATIVE FREE 10 ML: 5 INJECTION INTRAVENOUS at 09:47

## 2024-03-03 RX ADMIN — HYDROMORPHONE HYDROCHLORIDE 0.5 MG: 1 INJECTION, SOLUTION INTRAMUSCULAR; INTRAVENOUS; SUBCUTANEOUS at 15:45

## 2024-03-03 RX ADMIN — DEXAMETHASONE 4 MG: 4 TABLET ORAL at 11:40

## 2024-03-03 RX ADMIN — ENOXAPARIN SODIUM 40 MG: 100 INJECTION SUBCUTANEOUS at 20:59

## 2024-03-03 RX ADMIN — METHOCARBAMOL 1000 MG: 500 TABLET ORAL at 18:11

## 2024-03-03 RX ADMIN — SODIUM CHLORIDE 3000 MG: 900 INJECTION INTRAVENOUS at 19:12

## 2024-03-03 RX ADMIN — DEXAMETHASONE 4 MG: 4 TABLET ORAL at 16:50

## 2024-03-03 RX ADMIN — DEXAMETHASONE SODIUM PHOSPHATE 4 MG: 4 INJECTION INTRA-ARTICULAR; INTRALESIONAL; INTRAMUSCULAR; INTRAVENOUS; SOFT TISSUE at 06:38

## 2024-03-03 RX ADMIN — POLYETHYLENE GLYCOL (3350) 17 G: 17 POWDER, FOR SOLUTION ORAL at 23:04

## 2024-03-03 RX ADMIN — ACETAMINOPHEN 650 MG: 325 TABLET ORAL at 09:46

## 2024-03-03 RX ADMIN — HYDROMORPHONE HYDROCHLORIDE 0.5 MG: 1 INJECTION, SOLUTION INTRAMUSCULAR; INTRAVENOUS; SUBCUTANEOUS at 01:32

## 2024-03-03 RX ADMIN — OXYCODONE 10 MG: 5 TABLET ORAL at 11:44

## 2024-03-03 RX ADMIN — METHOCARBAMOL 1000 MG: 500 TABLET ORAL at 12:38

## 2024-03-03 RX ADMIN — SODIUM CHLORIDE 3000 MG: 900 INJECTION INTRAVENOUS at 11:40

## 2024-03-03 RX ADMIN — ACETAMINOPHEN 650 MG: 325 TABLET ORAL at 23:03

## 2024-03-03 RX ADMIN — ENOXAPARIN SODIUM 40 MG: 100 INJECTION SUBCUTANEOUS at 09:46

## 2024-03-03 RX ADMIN — OXYCODONE 10 MG: 5 TABLET ORAL at 16:50

## 2024-03-03 RX ADMIN — HYDROMORPHONE HYDROCHLORIDE 0.5 MG: 1 INJECTION, SOLUTION INTRAMUSCULAR; INTRAVENOUS; SUBCUTANEOUS at 09:02

## 2024-03-03 RX ADMIN — DIAZEPAM 10 MG: 5 TABLET ORAL at 20:58

## 2024-03-03 RX ADMIN — METHOCARBAMOL 1000 MG: 500 TABLET ORAL at 06:38

## 2024-03-03 RX ADMIN — OXYCODONE 10 MG: 5 TABLET ORAL at 00:42

## 2024-03-03 RX ADMIN — METOPROLOL SUCCINATE 100 MG: 100 TABLET, EXTENDED RELEASE ORAL at 09:46

## 2024-03-03 RX ADMIN — ACETAMINOPHEN 650 MG: 325 TABLET ORAL at 04:42

## 2024-03-03 RX ADMIN — DIAZEPAM 10 MG: 5 TABLET ORAL at 03:41

## 2024-03-03 ASSESSMENT — PAIN SCALES - GENERAL
PAINLEVEL_OUTOF10: 4
PAINLEVEL_OUTOF10: 4
PAINLEVEL_OUTOF10: 8
PAINLEVEL_OUTOF10: 7
PAINLEVEL_OUTOF10: 8
PAINLEVEL_OUTOF10: 7
PAINLEVEL_OUTOF10: 4
PAINLEVEL_OUTOF10: 4
PAINLEVEL_OUTOF10: 7
PAINLEVEL_OUTOF10: 0
PAINLEVEL_OUTOF10: 7
PAINLEVEL_OUTOF10: 9
PAINLEVEL_OUTOF10: 7

## 2024-03-03 ASSESSMENT — PAIN - FUNCTIONAL ASSESSMENT
PAIN_FUNCTIONAL_ASSESSMENT: PREVENTS OR INTERFERES SOME ACTIVE ACTIVITIES AND ADLS
PAIN_FUNCTIONAL_ASSESSMENT: PREVENTS OR INTERFERES WITH MANY ACTIVE NOT PASSIVE ACTIVITIES
PAIN_FUNCTIONAL_ASSESSMENT: PREVENTS OR INTERFERES WITH MANY ACTIVE NOT PASSIVE ACTIVITIES
PAIN_FUNCTIONAL_ASSESSMENT: PREVENTS OR INTERFERES SOME ACTIVE ACTIVITIES AND ADLS

## 2024-03-03 ASSESSMENT — PAIN DESCRIPTION - LOCATION
LOCATION: BACK

## 2024-03-03 ASSESSMENT — PAIN DESCRIPTION - ORIENTATION
ORIENTATION: MID;UPPER

## 2024-03-03 ASSESSMENT — PAIN DESCRIPTION - FREQUENCY
FREQUENCY: CONTINUOUS

## 2024-03-03 ASSESSMENT — PAIN DESCRIPTION - ONSET
ONSET: ON-GOING

## 2024-03-03 ASSESSMENT — PAIN DESCRIPTION - PAIN TYPE
TYPE: SURGICAL PAIN

## 2024-03-03 ASSESSMENT — PAIN DESCRIPTION - DESCRIPTORS
DESCRIPTORS: ACHING
DESCRIPTORS: ACHING
DESCRIPTORS: ACHING;DISCOMFORT
DESCRIPTORS: ACHING

## 2024-03-03 NOTE — PROGRESS NOTES
Internal Medicine Progress Note    Date: 3/3/2024   Patient: Richardson Vargas   Hospital Day: 2      CC: Gait Problem (Pt coming from [facility] for emergent MRI d/t spinal compression that is causing the pt to lose feeling in both of his legs. States that he got MRI of just lumbar spine and they recommended full spine MRI today. )       Interval Hx   Patient seen and examined this AM. Patient reports doing well.   Vitals stable.  Patient had T4-T7 bilateral laminectomy with decompression.  Patient reported doing well, able to left lower extremities.  Pain well-controlled with as needed medications.  Tolerating diet.  No acute signs of bleeding or infection.  Drain placed, output to 225 mL.  Patient has not had a bowel movement after surgery.  Last BM on Friday.  Monitor for now.    Labs reviewed stable. WBC  elevated, likely because of Decadron.       HPI: Richardson Vargas is a 53 yoM with a PMHx of HTN, GERD, BERNADINE, low back pain here for progressive bilateral LE weakness over the past month, associated with decreased ability to initiate urination and some subjective sensory loss from mid-torso distally. He reports associated sensation that he is wearing compression clothing in those regions. He says he has not tried anything for his sx and has not noticed any particular position that would relieve his sx. Pt now uses a cane 2/2 weakness. Saw outpt [facility] today who recommended to come for emergent MRI for concerns of cord myelopathy. Pt also endorses lowe back pain that was midline, mild and slightly worse since seeing a chiropractor at the beginning of the month for his sx.     The pt presented to [facility] one month ago for sx of difficulty and initial MRI at [facility] 2/15 that did not show any definitive issues that could be causing weakness at that time.     ED Course:  INITIAL VITALS: BP: (!) 179/83, Temp: 98.3 °F (36.8 °C), Pulse: 61, Respirations: 16, SpO2: 98 %    NSGY consulted, pt sent

## 2024-03-03 NOTE — PROGRESS NOTES
Patient comfortable, sitting up in bed, eating dinner. Family is at bedside. Pain controlled and vitals stable. Call light in reach.

## 2024-03-03 NOTE — PROGRESS NOTES
Occupational Therapy  Facility/Department: [facility] ORTHO/NEURO  Occupational Therapy Initial Assessment & Treatment    Name: Richardson Vargas  : 1970  MRN: [id number]  Date of Service: 3/3/2024    Discharge Recommendations:  24 hour supervision or assist, Home with Home health OT     Patient Diagnosis(es): The encounter diagnosis was Myelopathy of thoracic region.  Past Medical History:  has a past medical history of Essential hypertension, GERD (gastroesophageal reflux disease), Head trauma, Hypertension, Meniere's disease, and Sleep apnea.  Past Surgical History:  has a past surgical history that includes hernia repair; Tonsillectomy and adenoidectomy (child); Colonoscopy (2013); Upper gastrointestinal endoscopy (2013); and Upper gastrointestinal endoscopy (2016).    Treatment Diagnosis: impaired ADL and fxl mobiltiy      Assessment   Performance deficits / Impairments: Decreased functional mobility ;Decreased ADL status;Decreased endurance;Decreased balance  Assessment: Pt is 53y M from home w/ family; at baseline pt is IND w/ ADL and fxl mobility, though past month has been requiring assist for BADL tasks d/t pain. Pt presently SBA for supine>sit w/ HOB raised, and req MinA1 + CGA for sit<>stand and pivot to recliner. Pt is functioning well below baseline, and presently requiring assist to complete functional mobility. Participation limited in session by pt response to dilaudid which he rec'd in session while sitting EOB. Anticipate pt to progress with therapies w/ pain management between nursing and therapy staff. Pt may benefit from skilled OT while inpt, and after anticipated d/c to home w/ 24hr from family, to maximize safety, IND and fxl act norma needed for ADL and fxl mobilty. Will follow as inpt per POC  Treatment Diagnosis: impaired ADL and fxl mobiltiy  Prognosis: Good  Decision Making: Medium Complexity  REQUIRES OT FOLLOW-UP: Yes  Activity Tolerance  Activity Tolerance: Treatment  limited secondary to medical complications (free text)  Activity Tolerance Comments: Participation limited by pt response to dilaudid that was given to pt by RN at EOB.        Plan   Occupational Therapy Plan  Times Per Week: 5-7  Times Per Day: Once a day  Current Treatment Recommendations: Functional mobility training, Patient/Caregiver education & training, Equipment evaluation, education, & procurement, Safety education & training, Self-Care / ADL     Restrictions  Position Activity Restriction  Other position/activity restrictions: up with assist; activity as tolerated; adult diet REG    Subjective   General  Chart Reviewed: Yes, Orders, Progress Notes  Patient assessed for rehabilitation services?: Yes  Additional Pertinent Hx: 53 yoM with a PMHx of HTN, GERD, BERNADINE, low back pain here for progressive bilateral LE weakness over the past month, associated with decreased ability to initiate urination and some subjective sensory loss from mid-torso distally. He reports associated sensation that he is wearing compression clothing in those regions. 3/2 THORACIC 5 TO THORACIC 7 LAMINECTOMY WITH DECOMPRESSION  Referring Practitioner: Shalonda Yun PA  Diagnosis: myelopathy of thoracic region  Subjective  Subjective: pt sleepoing on approach  - wakened easily, agreed to eval  pt initially denying pain, however once sitting EOB reports significant increased pain to back, RN notified and providing pain medicine  Social/Functional History  Social/Functional History  Lives With: Spouse (sister-in-law, nephew)  Type of Home: House  Home Layout: Two level (bi-level)  Home Access: Stairs to enter with rails  Entrance Stairs - Number of Steps: 12 - with landings in between  Entrance Stairs - Rails: Both  Bathroom Shower/Tub: Walk-in shower  Bathroom Toilet: Standard (sink next to toilet for leverage)  Bathroom Equipment: None  Home Equipment: Cane (adjustable bed)  Has the patient had two or more falls in the past year or any

## 2024-03-03 NOTE — PROGRESS NOTES
Physical Therapy  Facility/Department: [facility] ORTHO/NEURO  Physical Therapy Initial Assessment    Name: Richardson Vargas  : 1970  MRN: [id number]  Date of Service: 3/3/2024    Discharge Recommendations:  24 hour supervision or assist, Home with Home health PT   PT Equipment Recommendations  Equipment Needed: Yes  Mobility Devices: Walker  Walker: Rolling      Patient Diagnosis(es): The encounter diagnosis was Myelopathy of thoracic region.  Past Medical History:  has a past medical history of Essential hypertension, GERD (gastroesophageal reflux disease), Head trauma, Hypertension, Meniere's disease, and Sleep apnea.  Past Surgical History:  has a past surgical history that includes hernia repair; Tonsillectomy and adenoidectomy (child); Colonoscopy (2013); Upper gastrointestinal endoscopy (2013); and Upper gastrointestinal endoscopy (2016).    Assessment   Body Structures, Functions, Activity Limitations Requiring Skilled Therapeutic Intervention: Decreased functional mobility ;Decreased endurance;Decreased balance;Increased pain  Assessment: Pt is 53 y.o. s/p thoracic laminectomy and decompression presenting with decreased functional mobility.  Pt is currently requiring assist x1-2 for all functional mobility, which is a decline from reported baseline.  Anticipate good progress throughout IP stay.  Pt will benefit from skilled therapy to maximize safety and independence.  PT recommends 24 hr supervision/assist with use of RW and HHPT for increased safety.  Treatment Diagnosis: decreased functional mobility s/p laminectomy and decompression  Therapy Prognosis: Good  Decision Making: Medium Complexity  Requires PT Follow-Up: Yes  Activity Tolerance  Activity Tolerance: Patient limited by fatigue;Patient limited by pain;Patient limited by endurance  Activity Tolerance Comments: \"wooziness\" after administration of dliaudid     Plan   Physical Therapy Plan  General Plan: 5-7 times per

## 2024-03-03 NOTE — PROGRESS NOTES
A&Ox4. Up x1-2, stood at bedside, did not ambulate due to pain at this time. [place] in place. 2 hemovac drains and wound vac in place and functioning well. Dressing intact. Pt endorses pain to back and shoulders, managed with PO and IV pain meds per MAR. Regular diet, takes pills whole. C/o indigestion/nausea, medicated x1 per MAR. VSS on room air. Home CPAP at night. Standard safety measures in place.

## 2024-03-03 NOTE — PLAN OF CARE
Problem: Safety - Adult  Goal: Free from fall injury  Outcome: Progressing  Note: Standard safety measures in place. Low bed, bed alarm armed. Pt verbalized understanding to call before getting OOB. Call light within reach.      Problem: Pain  Goal: Verbalizes/displays adequate comfort level or baseline comfort level  Outcome: Progressing  Note: Pain managed with PRN medications per MAR. See flowsheets for details.

## 2024-03-03 NOTE — PROGRESS NOTES
Neurosurgery Progress Note    3/3/2024 9:56 AM                               Richardson Vargas                      LOS: 2 days             POD # 1 T5-T7 laminectomy for severe spinal stenosis with cord signal change   Subjective:  No acute events overnight. Patient has no specific complaints this am. Pt moving legs well                                             Physical Exam:    Temp (24hrs), Av.9 °F (36.6 °C), Min:97.7 °F (36.5 °C), Max:98.1 °F (36.7 °C)      Neuro Exam  The patient is well-appearing and is in no acute distress.    Alert and oriented ×4, appropriate, conversant with normal mood and affect.  DTR 2+/4 symmetric  Capillary refill is less than 2 s in all 4 extremities, pulses are strong and symmetric. Sensation is intact   RUE: Deltoids: 5/5, Triceps: 5/5, Biceps: 5/5, WE/WF: 5/5, Finger abd: 5/5, : 5/5   LUE: Deltoids: 5/5, Triceps: 5/5, Biceps: 5/5, WE/WF: 5/5, Finger abd: 5/5, : 5/5  LLE: HE: 5/5, HF: 5/5, KE:5/5, KF: 5/5, PF: 5/5, DF: 5/5  RLE: HE: 5/5, HF: 5/5, KE:5/5, KF: 5/5, PF: 5/5, DF: 5/5    Labs:  Recent Labs     24  0735   WBC 26.9*   HGB 14.1   HCT 43.3          Recent Labs     24  0735      K 4.7      CO2 22   BUN 13   CREATININE 1.0   GLUCOSE 186*   CALCIUM 8.5   MG 2.20    Drain  Left :30/5  Right :190    No results for input(s): \"PROTIME\", \"INR\", \"APTT\" in the last 72 hours.      Assessment and Plan:  Pt is a 53 y.o. year old male POD # 1 T5-T7 laminectomy for severe spinal stenosis with cord signal change     Neurologically stable  ADAT, mobilze  DVT prophylaxis, SCDs  PT/OT  Drains remains in place  Wound vac in place   Pain management     Shalonda Benites MD, PhD   3/3/2024 9:56 AM   [phone]

## 2024-03-04 ENCOUNTER — APPOINTMENT (OUTPATIENT)
Dept: CT IMAGING | Age: 54
End: 2024-03-04
Payer: COMMERCIAL

## 2024-03-04 LAB
ANION GAP SERPL CALCULATED.3IONS-SCNC: 12 MMOL/L (ref 3–16)
BASOPHILS # BLD: 0 K/UL (ref 0–0.2)
BASOPHILS NFR BLD: 0.1 %
BUN SERPL-MCNC: 14 MG/DL (ref 7–20)
CALCIUM SERPL-MCNC: 8 MG/DL (ref 8.3–10.6)
CHLORIDE SERPL-SCNC: 104 MMOL/L (ref 99–110)
CO2 SERPL-SCNC: 23 MMOL/L (ref 21–32)
CREAT SERPL-MCNC: 1 MG/DL (ref 0.9–1.3)
DEPRECATED RDW RBC AUTO: 14.9 % (ref 12.4–15.4)
EOSINOPHIL # BLD: 0 K/UL (ref 0–0.6)
EOSINOPHIL NFR BLD: 0 %
GFR SERPLBLD CREATININE-BSD FMLA CKD-EPI: >60 ML/MIN/{1.73_M2}
GLUCOSE BLD-MCNC: 249 MG/DL (ref 70–99)
GLUCOSE BLD-MCNC: 271 MG/DL (ref 70–99)
GLUCOSE BLD-MCNC: 275 MG/DL (ref 70–99)
GLUCOSE SERPL-MCNC: 220 MG/DL (ref 70–99)
HCT VFR BLD AUTO: 40.8 % (ref 40.5–52.5)
HGB BLD-MCNC: 13.3 G/DL (ref 13.5–17.5)
LYMPHOCYTES # BLD: 1.2 K/UL (ref 1–5.1)
LYMPHOCYTES NFR BLD: 5.3 %
MAGNESIUM SERPL-MCNC: 2.1 MG/DL (ref 1.8–2.4)
MCH RBC QN AUTO: 28.5 PG (ref 26–34)
MCHC RBC AUTO-ENTMCNC: 32.6 G/DL (ref 31–36)
MCV RBC AUTO: 87.3 FL (ref 80–100)
MONOCYTES # BLD: 1.2 K/UL (ref 0–1.3)
MONOCYTES NFR BLD: 5.4 %
NEUTROPHILS # BLD: 19.7 K/UL (ref 1.7–7.7)
NEUTROPHILS NFR BLD: 89.2 %
PERFORMED ON: ABNORMAL
PLATELET # BLD AUTO: 232 K/UL (ref 135–450)
PMV BLD AUTO: 8.9 FL (ref 5–10.5)
POTASSIUM SERPL-SCNC: 4.4 MMOL/L (ref 3.5–5.1)
RBC # BLD AUTO: 4.67 M/UL (ref 4.2–5.9)
SODIUM SERPL-SCNC: 139 MMOL/L (ref 136–145)
WBC # BLD AUTO: 22.1 K/UL (ref 4–11)

## 2024-03-04 PROCEDURE — 2700000000 HC OXYGEN THERAPY PER DAY

## 2024-03-04 PROCEDURE — 99024 POSTOP FOLLOW-UP VISIT: CPT | Performed by: NURSE PRACTITIONER

## 2024-03-04 PROCEDURE — 97116 GAIT TRAINING THERAPY: CPT

## 2024-03-04 PROCEDURE — APPNB30 APP NON BILLABLE TIME 0-30 MINS: Performed by: NURSE PRACTITIONER

## 2024-03-04 PROCEDURE — 6360000002 HC RX W HCPCS: Performed by: NEUROLOGICAL SURGERY

## 2024-03-04 PROCEDURE — 2580000003 HC RX 258: Performed by: NEUROLOGICAL SURGERY

## 2024-03-04 PROCEDURE — 6370000000 HC RX 637 (ALT 250 FOR IP): Performed by: NURSE PRACTITIONER

## 2024-03-04 PROCEDURE — 85025 COMPLETE CBC W/AUTO DIFF WBC: CPT

## 2024-03-04 PROCEDURE — 97530 THERAPEUTIC ACTIVITIES: CPT

## 2024-03-04 PROCEDURE — 94761 N-INVAS EAR/PLS OXIMETRY MLT: CPT

## 2024-03-04 PROCEDURE — 36415 COLL VENOUS BLD VENIPUNCTURE: CPT

## 2024-03-04 PROCEDURE — 83036 HEMOGLOBIN GLYCOSYLATED A1C: CPT

## 2024-03-04 PROCEDURE — 6360000002 HC RX W HCPCS: Performed by: PHYSICIAN ASSISTANT

## 2024-03-04 PROCEDURE — 6370000000 HC RX 637 (ALT 250 FOR IP)

## 2024-03-04 PROCEDURE — 2580000003 HC RX 258: Performed by: PHYSICIAN ASSISTANT

## 2024-03-04 PROCEDURE — 97535 SELF CARE MNGMENT TRAINING: CPT

## 2024-03-04 PROCEDURE — 83735 ASSAY OF MAGNESIUM: CPT

## 2024-03-04 PROCEDURE — 80048 BASIC METABOLIC PNL TOTAL CA: CPT

## 2024-03-04 PROCEDURE — 2060000000 HC ICU INTERMEDIATE R&B

## 2024-03-04 PROCEDURE — 72128 CT CHEST SPINE W/O DYE: CPT

## 2024-03-04 PROCEDURE — 2580000003 HC RX 258

## 2024-03-04 PROCEDURE — 94660 CPAP INITIATION&MGMT: CPT

## 2024-03-04 PROCEDURE — 6370000000 HC RX 637 (ALT 250 FOR IP): Performed by: PHYSICIAN ASSISTANT

## 2024-03-04 RX ORDER — DEXTROSE MONOHYDRATE 100 MG/ML
INJECTION, SOLUTION INTRAVENOUS CONTINUOUS PRN
Status: DISCONTINUED | OUTPATIENT
Start: 2024-03-04 | End: 2024-03-07 | Stop reason: HOSPADM

## 2024-03-04 RX ORDER — SENNA AND DOCUSATE SODIUM 50; 8.6 MG/1; MG/1
2 TABLET, FILM COATED ORAL 2 TIMES DAILY
Status: DISCONTINUED | OUTPATIENT
Start: 2024-03-04 | End: 2024-03-07 | Stop reason: HOSPADM

## 2024-03-04 RX ORDER — GLUCAGON 1 MG/ML
1 KIT INJECTION PRN
Status: DISCONTINUED | OUTPATIENT
Start: 2024-03-04 | End: 2024-03-07 | Stop reason: HOSPADM

## 2024-03-04 RX ORDER — INSULIN LISPRO 100 [IU]/ML
0-4 INJECTION, SOLUTION INTRAVENOUS; SUBCUTANEOUS NIGHTLY
Status: DISCONTINUED | OUTPATIENT
Start: 2024-03-04 | End: 2024-03-07 | Stop reason: HOSPADM

## 2024-03-04 RX ORDER — POLYETHYLENE GLYCOL 3350 17 G/17G
17 POWDER, FOR SOLUTION ORAL 2 TIMES DAILY
Status: DISCONTINUED | OUTPATIENT
Start: 2024-03-04 | End: 2024-03-07 | Stop reason: HOSPADM

## 2024-03-04 RX ORDER — TAMSULOSIN HYDROCHLORIDE 0.4 MG/1
0.4 CAPSULE ORAL DAILY
Status: DISCONTINUED | OUTPATIENT
Start: 2024-03-04 | End: 2024-03-07 | Stop reason: HOSPADM

## 2024-03-04 RX ORDER — INSULIN LISPRO 100 [IU]/ML
0-4 INJECTION, SOLUTION INTRAVENOUS; SUBCUTANEOUS
Status: DISCONTINUED | OUTPATIENT
Start: 2024-03-04 | End: 2024-03-07 | Stop reason: HOSPADM

## 2024-03-04 RX ADMIN — DEXAMETHASONE 4 MG: 4 TABLET ORAL at 14:56

## 2024-03-04 RX ADMIN — INSULIN LISPRO 2 UNITS: 100 INJECTION, SOLUTION INTRAVENOUS; SUBCUTANEOUS at 12:34

## 2024-03-04 RX ADMIN — OXYCODONE 10 MG: 5 TABLET ORAL at 05:37

## 2024-03-04 RX ADMIN — OXYCODONE 10 MG: 5 TABLET ORAL at 09:38

## 2024-03-04 RX ADMIN — DIAZEPAM 10 MG: 5 TABLET ORAL at 16:44

## 2024-03-04 RX ADMIN — POLYETHYLENE GLYCOL 3350 17 G: 17 POWDER, FOR SOLUTION ORAL at 11:59

## 2024-03-04 RX ADMIN — ACETAMINOPHEN 650 MG: 325 TABLET ORAL at 09:23

## 2024-03-04 RX ADMIN — OXYCODONE 5 MG: 5 TABLET ORAL at 14:56

## 2024-03-04 RX ADMIN — DIAZEPAM 10 MG: 5 TABLET ORAL at 03:59

## 2024-03-04 RX ADMIN — DIAZEPAM 10 MG: 5 TABLET ORAL at 23:17

## 2024-03-04 RX ADMIN — ENOXAPARIN SODIUM 40 MG: 100 INJECTION SUBCUTANEOUS at 09:24

## 2024-03-04 RX ADMIN — OXYCODONE 10 MG: 5 TABLET ORAL at 01:19

## 2024-03-04 RX ADMIN — INSULIN LISPRO 1 UNITS: 100 INJECTION, SOLUTION INTRAVENOUS; SUBCUTANEOUS at 18:44

## 2024-03-04 RX ADMIN — PANTOPRAZOLE SODIUM 40 MG: 40 TABLET, DELAYED RELEASE ORAL at 05:37

## 2024-03-04 RX ADMIN — METHOCARBAMOL 1000 MG: 500 TABLET ORAL at 05:37

## 2024-03-04 RX ADMIN — HYDROMORPHONE HYDROCHLORIDE 0.5 MG: 1 INJECTION, SOLUTION INTRAMUSCULAR; INTRAVENOUS; SUBCUTANEOUS at 16:44

## 2024-03-04 RX ADMIN — DEXAMETHASONE 4 MG: 4 TABLET ORAL at 20:51

## 2024-03-04 RX ADMIN — SODIUM CHLORIDE 3000 MG: 900 INJECTION INTRAVENOUS at 19:50

## 2024-03-04 RX ADMIN — SODIUM CHLORIDE, PRESERVATIVE FREE 10 ML: 5 INJECTION INTRAVENOUS at 20:56

## 2024-03-04 RX ADMIN — SODIUM CHLORIDE, PRESERVATIVE FREE 10 ML: 5 INJECTION INTRAVENOUS at 09:24

## 2024-03-04 RX ADMIN — POLYETHYLENE GLYCOL 3350 17 G: 17 POWDER, FOR SOLUTION ORAL at 20:51

## 2024-03-04 RX ADMIN — DOCUSATE SODIUM 50 MG AND SENNOSIDES 8.6 MG 2 TABLET: 8.6; 5 TABLET, FILM COATED ORAL at 11:59

## 2024-03-04 RX ADMIN — ACETAMINOPHEN 650 MG: 325 TABLET ORAL at 16:44

## 2024-03-04 RX ADMIN — DOCUSATE SODIUM 50 MG AND SENNOSIDES 8.6 MG 2 TABLET: 8.6; 5 TABLET, FILM COATED ORAL at 20:51

## 2024-03-04 RX ADMIN — SODIUM CHLORIDE 3000 MG: 900 INJECTION INTRAVENOUS at 03:59

## 2024-03-04 RX ADMIN — OXYCODONE 10 MG: 5 TABLET ORAL at 20:51

## 2024-03-04 RX ADMIN — SODIUM CHLORIDE 3000 MG: 900 INJECTION INTRAVENOUS at 12:06

## 2024-03-04 RX ADMIN — TAMSULOSIN HYDROCHLORIDE 0.4 MG: 0.4 CAPSULE ORAL at 11:59

## 2024-03-04 RX ADMIN — ACETAMINOPHEN 650 MG: 325 TABLET ORAL at 23:17

## 2024-03-04 RX ADMIN — SODIUM CHLORIDE, PRESERVATIVE FREE 10 ML: 5 INJECTION INTRAVENOUS at 20:59

## 2024-03-04 RX ADMIN — DEXAMETHASONE 4 MG: 4 TABLET ORAL at 05:37

## 2024-03-04 RX ADMIN — ENOXAPARIN SODIUM 40 MG: 100 INJECTION SUBCUTANEOUS at 20:56

## 2024-03-04 RX ADMIN — ACETAMINOPHEN 650 MG: 325 TABLET ORAL at 05:37

## 2024-03-04 ASSESSMENT — PAIN DESCRIPTION - DESCRIPTORS
DESCRIPTORS: DISCOMFORT
DESCRIPTORS: DISCOMFORT
DESCRIPTORS: THROBBING

## 2024-03-04 ASSESSMENT — PAIN DESCRIPTION - PAIN TYPE
TYPE: SURGICAL PAIN
TYPE: SURGICAL PAIN

## 2024-03-04 ASSESSMENT — PAIN SCALES - GENERAL
PAINLEVEL_OUTOF10: 2
PAINLEVEL_OUTOF10: 5
PAINLEVEL_OUTOF10: 7
PAINLEVEL_OUTOF10: 6
PAINLEVEL_OUTOF10: 7
PAINLEVEL_OUTOF10: 7
PAINLEVEL_OUTOF10: 2
PAINLEVEL_OUTOF10: 5

## 2024-03-04 ASSESSMENT — PAIN DESCRIPTION - LOCATION
LOCATION: BACK
LOCATION: INCISION
LOCATION: INCISION
LOCATION: SHOULDER

## 2024-03-04 ASSESSMENT — PAIN DESCRIPTION - ORIENTATION
ORIENTATION: MID

## 2024-03-04 ASSESSMENT — PAIN DESCRIPTION - FREQUENCY
FREQUENCY: INTERMITTENT
FREQUENCY: INTERMITTENT

## 2024-03-04 ASSESSMENT — PAIN DESCRIPTION - ONSET
ONSET: PROGRESSIVE
ONSET: PROGRESSIVE

## 2024-03-04 ASSESSMENT — PAIN - FUNCTIONAL ASSESSMENT
PAIN_FUNCTIONAL_ASSESSMENT: ACTIVITIES ARE NOT PREVENTED
PAIN_FUNCTIONAL_ASSESSMENT: ACTIVITIES ARE NOT PREVENTED
PAIN_FUNCTIONAL_ASSESSMENT: PREVENTS OR INTERFERES SOME ACTIVE ACTIVITIES AND ADLS

## 2024-03-04 NOTE — PROGRESS NOTES
VSS. Patient's pain controlled with prn meds per mar. Wife at bedside throughout night. Patient states he is feeling constipated; prn meds given per mar. No acute events overnight

## 2024-03-04 NOTE — PROGRESS NOTES
Report received from CHARISSA Cho.    attention.      Spinal cord myelopathy and severe stenosis C2-C7  Progressive bilateral leg weakness for months.  Recent urinary difficulty, sensory changes.  Exam:    MRI multilevel severe preforaminal and neuroforaminal stenosis from C2-C7.  Thoracic spine cord myelopathy and severe stenosis with posterior ligamentous hypertrophy at T4-T5.  CT reveals moderate spinal canal stenosis.  -Neurosurgery consulted; Plan for laminectomy 3/2/24; OR time TBD  -(3/02) T4-T7 bilateral laminectomy and decompression by Dr. Dean; patient tolerated procedure well  -Continue Decadron 4 Mg every 6 hours; taper as directed  -Continue Protonix 40 Mg daily  -Neurochecks q4hr  - Close suction drain; f/u neurosurgery recs  -PT OT scores 14; d/c recommendation home with home health care  -Robaxin 1000 mg every 6 hours PRN, Dilaudid 0.5 Mg Q3HR PRN oxycodone 10 Mg Q4hr PRN  -Zofran for nausea   - discontinued fluids    Elevated blood Glucose  Lekocytosis, likely reactive  Elevated WBC and blood glucose. Patient is on decadron, likely reactive  - monitor for signs of infection  - (3/4) started LDSSI    Chronic conditions:  Hypertension: Continue home metoprolol  GERD: Patient not taking Prilosec anymore  BERNADINE: Patient will get home BiPAP  Obesity: BMI of 45;     DVT PPx: SCD  Diet: ADULT DIET; Regular   Code status:  Full Code     ELOS: 3  Barriers to discharge:  Disposition  - Preadmission: home  - Current: [facility]  - Upon discharge: TBD    Will discuss with attending physician Sahara Lynn MD     _____________________  Jordi Ray MD   Internal Medicine Resident, PGY-1

## 2024-03-04 NOTE — PLAN OF CARE
Problem: Safety - Adult  Goal: Free from fall injury  3/4/2024 0107 by Amanda Kaur, RN  Outcome: Progressing  3/3/2024 1639 by Светлана Parnell, RN  Outcome: Progressing  All fall precautions in place. Bed locked and in lowest position with alarm on. Overbed table and personal belonings within reach. Call light within reach and patient instructed to use call light for assistance. Non-skid socks on.       Problem: Skin/Tissue Integrity  Goal: Absence of new skin breakdown  Description: 1.  Monitor for areas of redness and/or skin breakdown  2.  Assess vascular access sites hourly  3.  Every 4-6 hours minimum:  Change oxygen saturation probe site  4.  Every 4-6 hours:  If on nasal continuous positive airway pressure, respiratory therapy assess nares and determine need for appliance change or resting period.  Outcome: Progressing   Skin assessed this shift. Shonda care completed as necessary. Patient alternating positions to reduce pressure and prevent skin injury q2 and as needed.
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
## 2024-03-04 NOTE — PROGRESS NOTES
2 times per day **FOLLOWED BY** [START ON 3/7/2024] dexAMETHasone, 4 mg, Oral, Daily    ceFAZolin, 3,000 mg, IntraVENous, Q8H    sodium chloride flush, 5-40 mL, IntraVENous, 2 times per day    acetaminophen, 650 mg, Oral, Q6H    enoxaparin, 40 mg, SubCUTAneous, BID    metoprolol succinate, 100 mg, Oral, Daily    sodium chloride flush, 5-40 mL, IntraVENous, 2 times per day    pantoprazole, 40 mg, Oral, QAM AC   Infusions    dextrose      sodium chloride      sodium chloride        Antibiotics   Recent Abx Admin                     ceFAZolin Sodium (ANCEF) 3,000 mg in sodium chloride 0.9 % 100 mL (mini-bag) (mg) 3,000 mg New Bag 03/04/24 0359     3,000 mg New Bag 03/03/24 1912     3,000 mg New Bag  1140                     Neurologic Exam:  Mental status: awake and alert and oriented x4      Musculoskeletal:   Gait: Not tested   Tone: normal  Sensory: intact to all extremities  Motor strength:    Right  Left    Right  Left    Deltoid  5 5  Hip Flex  2 2   Biceps  5 5  Knee Extensors  3 3   Triceps  5 5  Knee Flexors  3 3   Wrist Ext  5 5  Ankle Dorsiflex.  5 5   Wrist Flex  5 5  Ankle Plantarflex.  5 5   Handgrip  5 5  Ext Thony Longus  5 5   Thumb Ext  5 5         Incision: intact, clean and dry  Drain:l ;50   R:30    Respiratory:  Unlabored respiratory pattern    Abdomen:   Soft, ND   Last BM preop    Cardiovascular:  Warm, well perfused    Assessment   Patient is a 54 yo M s/p Procedure(s) (LRB):  THORACIC 5 TO THORACIC 7 LAMINECTOMY WITH DECOMPRESSION (N/A) per Dr. Benites .     Plan:  Neurologic exam frequency:q4  Mobility:PT/OT eval  Diet:ADAT  Antibiotics:continue while drain in  [place]:removed  Sterted flomax  Drain: continue till output < 30  shift  DVT Prophylaxis: SCDs and lovenox  Bowel Regimen: senna and glycolax and movantik  Pain control:thomas   Spasm: valium prn and robaxin prn  Ice to incision area q2  Incisional Care:open to air and wash with warm soapy water daily  Consult rehab  CT thoracic  Dispo  Planning:will let drain slow down     Patient was discussed with Dr. Banda who agrees with above assessment and plan.     Electronically signed by: ALLISON Vigil - CNP, 3/4/2024 10:54 AM   Neurosurgery Nurse Practitioner  [phone]

## 2024-03-04 NOTE — PROGRESS NOTES
Pt alert and oriented x4, VSS on room air. No acute events this shift. Pt's pain being managed by meds per MAR. Ambulating via walker/gb. Dressing CDI. Wound vacs draining well. Output in chart. Fall precautions in place, call light within reach.

## 2024-03-04 NOTE — PLAN OF CARE
Problem: Discharge Planning  Goal: Discharge to home or other facility with appropriate resources  Outcome: Progressing     Problem: Safety - Adult  Goal: Free from fall injury  3/4/2024 1339 by Joyce Todd RN  Outcome: Progressing   -fall precautions in place, call light within reach    Problem: Pain  Goal: Verbalizes/displays adequate comfort level or baseline comfort level  Outcome: Progressing   -pain being managed by meds per MAR    Problem: ABCDS Injury Assessment  Goal: Absence of physical injury  Outcome: Progressing

## 2024-03-04 NOTE — PROGRESS NOTES
[facility] - Acute Rehab Unit     This pt has elected to return home at d/c and does not wish to pursue ARU. Please reach back out if the patient changes his mind about his post acute needs.    Thank you for the referral.    Keyon Santos, PT, DPT  Clinical Liaison- [facility]   (P): [phone] (F): [phone]

## 2024-03-05 LAB
ANION GAP SERPL CALCULATED.3IONS-SCNC: 11 MMOL/L (ref 3–16)
BASOPHILS # BLD: 0 K/UL (ref 0–0.2)
BASOPHILS NFR BLD: 0 %
BUN SERPL-MCNC: 13 MG/DL (ref 7–20)
CALCIUM SERPL-MCNC: 8.1 MG/DL (ref 8.3–10.6)
CHLORIDE SERPL-SCNC: 103 MMOL/L (ref 99–110)
CO2 SERPL-SCNC: 25 MMOL/L (ref 21–32)
CREAT SERPL-MCNC: 0.9 MG/DL (ref 0.9–1.3)
DEPRECATED RDW RBC AUTO: 14.6 % (ref 12.4–15.4)
EOSINOPHIL # BLD: 0 K/UL (ref 0–0.6)
EOSINOPHIL NFR BLD: 0 %
EST. AVERAGE GLUCOSE BLD GHB EST-MCNC: 122.6 MG/DL
GFR SERPLBLD CREATININE-BSD FMLA CKD-EPI: >60 ML/MIN/{1.73_M2}
GLUCOSE BLD-MCNC: 164 MG/DL (ref 70–99)
GLUCOSE BLD-MCNC: 166 MG/DL (ref 70–99)
GLUCOSE BLD-MCNC: 189 MG/DL (ref 70–99)
GLUCOSE BLD-MCNC: 191 MG/DL (ref 70–99)
GLUCOSE SERPL-MCNC: 209 MG/DL (ref 70–99)
HBA1C MFR BLD: 5.9 %
HCT VFR BLD AUTO: 40.2 % (ref 40.5–52.5)
HGB BLD-MCNC: 13.4 G/DL (ref 13.5–17.5)
LYMPHOCYTES # BLD: 2.1 K/UL (ref 1–5.1)
LYMPHOCYTES NFR BLD: 10 %
MAGNESIUM SERPL-MCNC: 2.1 MG/DL (ref 1.8–2.4)
MCH RBC QN AUTO: 28.4 PG (ref 26–34)
MCHC RBC AUTO-ENTMCNC: 33.4 G/DL (ref 31–36)
MCV RBC AUTO: 85 FL (ref 80–100)
MONOCYTES # BLD: 1.2 K/UL (ref 0–1.3)
MONOCYTES NFR BLD: 6 %
NEUTROPHILS # BLD: 17.4 K/UL (ref 1.7–7.7)
NEUTROPHILS NFR BLD: 84 %
PERFORMED ON: ABNORMAL
PLATELET # BLD AUTO: 235 K/UL (ref 135–450)
PLATELET BLD QL SMEAR: ADEQUATE
PMV BLD AUTO: 8.9 FL (ref 5–10.5)
POTASSIUM SERPL-SCNC: 4.2 MMOL/L (ref 3.5–5.1)
RBC # BLD AUTO: 4.73 M/UL (ref 4.2–5.9)
RBC MORPH BLD: NORMAL
SLIDE REVIEW: ABNORMAL
SODIUM SERPL-SCNC: 139 MMOL/L (ref 136–145)
WBC # BLD AUTO: 20.7 K/UL (ref 4–11)

## 2024-03-05 PROCEDURE — 6370000000 HC RX 637 (ALT 250 FOR IP): Performed by: PHYSICIAN ASSISTANT

## 2024-03-05 PROCEDURE — 6370000000 HC RX 637 (ALT 250 FOR IP)

## 2024-03-05 PROCEDURE — 97116 GAIT TRAINING THERAPY: CPT

## 2024-03-05 PROCEDURE — 80048 BASIC METABOLIC PNL TOTAL CA: CPT

## 2024-03-05 PROCEDURE — 6370000000 HC RX 637 (ALT 250 FOR IP): Performed by: NURSE PRACTITIONER

## 2024-03-05 PROCEDURE — 85025 COMPLETE CBC W/AUTO DIFF WBC: CPT

## 2024-03-05 PROCEDURE — 97530 THERAPEUTIC ACTIVITIES: CPT

## 2024-03-05 PROCEDURE — 99024 POSTOP FOLLOW-UP VISIT: CPT | Performed by: NURSE PRACTITIONER

## 2024-03-05 PROCEDURE — 36415 COLL VENOUS BLD VENIPUNCTURE: CPT

## 2024-03-05 PROCEDURE — 2580000003 HC RX 258

## 2024-03-05 PROCEDURE — 6360000002 HC RX W HCPCS: Performed by: NEUROLOGICAL SURGERY

## 2024-03-05 PROCEDURE — 6360000002 HC RX W HCPCS: Performed by: PHYSICIAN ASSISTANT

## 2024-03-05 PROCEDURE — APPNB30 APP NON BILLABLE TIME 0-30 MINS: Performed by: NURSE PRACTITIONER

## 2024-03-05 PROCEDURE — 2580000003 HC RX 258: Performed by: NEUROLOGICAL SURGERY

## 2024-03-05 PROCEDURE — 2060000000 HC ICU INTERMEDIATE R&B

## 2024-03-05 PROCEDURE — 2580000003 HC RX 258: Performed by: PHYSICIAN ASSISTANT

## 2024-03-05 PROCEDURE — 83735 ASSAY OF MAGNESIUM: CPT

## 2024-03-05 RX ADMIN — ACETAMINOPHEN 650 MG: 325 TABLET ORAL at 11:32

## 2024-03-05 RX ADMIN — ENOXAPARIN SODIUM 40 MG: 100 INJECTION SUBCUTANEOUS at 21:44

## 2024-03-05 RX ADMIN — ACETAMINOPHEN 650 MG: 325 TABLET ORAL at 18:50

## 2024-03-05 RX ADMIN — SODIUM CHLORIDE 3000 MG: 900 INJECTION INTRAVENOUS at 03:47

## 2024-03-05 RX ADMIN — DIAZEPAM 10 MG: 5 TABLET ORAL at 11:32

## 2024-03-05 RX ADMIN — OXYCODONE 5 MG: 5 TABLET ORAL at 03:48

## 2024-03-05 RX ADMIN — DEXAMETHASONE 4 MG: 4 TABLET ORAL at 14:44

## 2024-03-05 RX ADMIN — TAMSULOSIN HYDROCHLORIDE 0.4 MG: 0.4 CAPSULE ORAL at 08:42

## 2024-03-05 RX ADMIN — SODIUM CHLORIDE, PRESERVATIVE FREE 10 ML: 5 INJECTION INTRAVENOUS at 08:44

## 2024-03-05 RX ADMIN — SODIUM CHLORIDE, PRESERVATIVE FREE 10 ML: 5 INJECTION INTRAVENOUS at 21:25

## 2024-03-05 RX ADMIN — OXYCODONE 10 MG: 5 TABLET ORAL at 18:50

## 2024-03-05 RX ADMIN — OXYCODONE 10 MG: 5 TABLET ORAL at 14:44

## 2024-03-05 RX ADMIN — METOPROLOL SUCCINATE 100 MG: 100 TABLET, EXTENDED RELEASE ORAL at 08:42

## 2024-03-05 RX ADMIN — PANTOPRAZOLE SODIUM 40 MG: 40 TABLET, DELAYED RELEASE ORAL at 05:49

## 2024-03-05 RX ADMIN — OXYCODONE 10 MG: 5 TABLET ORAL at 08:51

## 2024-03-05 RX ADMIN — SODIUM CHLORIDE 3000 MG: 900 INJECTION INTRAVENOUS at 20:18

## 2024-03-05 RX ADMIN — ACETAMINOPHEN 650 MG: 325 TABLET ORAL at 05:49

## 2024-03-05 RX ADMIN — HYDROMORPHONE HYDROCHLORIDE 0.5 MG: 1 INJECTION, SOLUTION INTRAMUSCULAR; INTRAVENOUS; SUBCUTANEOUS at 00:02

## 2024-03-05 RX ADMIN — NALOXEGOL OXALATE 12.5 MG: 12.5 TABLET, FILM COATED ORAL at 05:49

## 2024-03-05 RX ADMIN — ENOXAPARIN SODIUM 40 MG: 100 INJECTION SUBCUTANEOUS at 08:43

## 2024-03-05 RX ADMIN — SODIUM CHLORIDE 3000 MG: 900 INJECTION INTRAVENOUS at 11:30

## 2024-03-05 RX ADMIN — SODIUM CHLORIDE, PRESERVATIVE FREE 10 ML: 5 INJECTION INTRAVENOUS at 21:20

## 2024-03-05 RX ADMIN — DIAZEPAM 10 MG: 5 TABLET ORAL at 20:33

## 2024-03-05 RX ADMIN — DEXAMETHASONE 4 MG: 4 TABLET ORAL at 05:50

## 2024-03-05 ASSESSMENT — PAIN DESCRIPTION - ONSET
ONSET: ON-GOING

## 2024-03-05 ASSESSMENT — PAIN DESCRIPTION - DESCRIPTORS
DESCRIPTORS: ACHING;DISCOMFORT
DESCRIPTORS: ACHING;DISCOMFORT
DESCRIPTORS: THROBBING
DESCRIPTORS: THROBBING
DESCRIPTORS: ACHING;DISCOMFORT

## 2024-03-05 ASSESSMENT — PAIN SCALES - GENERAL
PAINLEVEL_OUTOF10: 7
PAINLEVEL_OUTOF10: 8
PAINLEVEL_OUTOF10: 1
PAINLEVEL_OUTOF10: 8
PAINLEVEL_OUTOF10: 8
PAINLEVEL_OUTOF10: 6
PAINLEVEL_OUTOF10: 6
PAINLEVEL_OUTOF10: 3
PAINLEVEL_OUTOF10: 3
PAINLEVEL_OUTOF10: 5
PAINLEVEL_OUTOF10: 1

## 2024-03-05 ASSESSMENT — PAIN DESCRIPTION - LOCATION
LOCATION: BACK

## 2024-03-05 ASSESSMENT — PAIN DESCRIPTION - FREQUENCY
FREQUENCY: CONTINUOUS

## 2024-03-05 ASSESSMENT — PAIN DESCRIPTION - PAIN TYPE
TYPE: SURGICAL PAIN

## 2024-03-05 ASSESSMENT — PAIN DESCRIPTION - ORIENTATION
ORIENTATION: MID
ORIENTATION: POSTERIOR
ORIENTATION: POSTERIOR
ORIENTATION: MID
ORIENTATION: MID

## 2024-03-05 NOTE — PROGRESS NOTES
Pt stating that she is regaining sensation to her R cheek but it is painful and it is painful to touch.  Ward Tamayo NP made aware via [employer].

## 2024-03-05 NOTE — PLAN OF CARE
Problem: Safety - Adult  Goal: Free from fall injury  3/5/2024 0740 by Debbi Arias, RN  Outcome: Progressing  All fall precautions in place. Bed locked and in lowest position with alarm on. Overbed table and personal belonings within reach. Call light within reach and patient instructed to use call light for assistance. Non-skid socks on.      Problem: Pain  Goal: Verbalizes/displays adequate comfort level or baseline comfort level  3/5/2024 0740 by Debbi Arias, RN  Outcome: Progressing  Pt endorsing pain to back. Being treated with PRN pain medication, rest, and frequent repositioning with pillow support for comfort and pressure relief. Pt reports some relief from pain with above interventions.

## 2024-03-05 NOTE — PROGRESS NOTES
Internal Medicine Progress Note    Date: 3/5/2024   Patient: Richardson Vargas   Hospital Day: 4      CC: Gait Problem (Pt coming from [facility] for emergent MRI d/t spinal compression that is causing the pt to lose feeling in both of his legs. States that he got MRI of just lumbar spine and they recommended full spine MRI today. )       Interval Hx   Patient seen and examined this AM. Patient reports doing well and pain well controlled with IV pain meds. .   Vitals stable.  Patient had T4-T7 bilateral laminectomy with decompression.  Patient reported he is better, able to lift lower extremities and the \"stocking feeling\" in his thigh is improving.   Tolerating diet.  No acute signs of bleeding or infection.  Drain placed, output to 870/90 mL.  Patient had loose bowel movement yesterday; on mirilax and senna. He is also urinating better with flomax.    Labs reviewed. Glucose elevated (patient on decadron); BG well controlled with LDSSI. WBC  elevated, likely because of Decadron; trending down.    No acute signs of infection.     HPI: Richardson Vargas is a 53 yoM with a PMHx of HTN, GERD, BERNADINE, low back pain here for progressive bilateral LE weakness over the past month, associated with decreased ability to initiate urination and some subjective sensory loss from mid-torso distally. He reports associated sensation that he is wearing compression clothing in those regions. He says he has not tried anything for his sx and has not noticed any particular position that would relieve his sx. Pt now uses a cane 2/2 weakness. Saw outpt [facility] today who recommended to come for emergent MRI for concerns of cord myelopathy. Pt also endorses lowe back pain that was midline, mild and slightly worse since seeing a chiropractor at the beginning of the month for his sx.     The pt presented to [facility] one month ago for sx of difficulty and initial MRI at [facility] 2/15 that did not show any definitive issues that    Internal Medicine Resident, PGY-1

## 2024-03-05 NOTE — PLAN OF CARE
Problem: Discharge Planning  Goal: Discharge to home or other facility with appropriate resources  3/5/2024 0137 by Lauren Sutton RN  Outcome: Progressing     Problem: Safety - Adult  Goal: Free from fall injury  3/5/2024 0137 by Lauren Sutton RN  Outcome: Progressing     Problem: Pain  Goal: Verbalizes/displays adequate comfort level or baseline comfort level  3/5/2024 0137 by Lauren Sutton RN  Outcome: Progressing     Problem: ABCDS Injury Assessment  Goal: Absence of physical injury  3/5/2024 0137 by Lauren Sutton RN  Outcome: Progressing     Problem: Skin/Tissue Integrity  Goal: Absence of new skin breakdown  Description: 1.  Monitor for areas of redness and/or skin breakdown  2.  Assess vascular access sites hourly  3.  Every 4-6 hours minimum:  Change oxygen saturation probe site  4.  Every 4-6 hours:  If on nasal continuous positive airway pressure, respiratory therapy assess nares and determine need for appliance change or resting period.  Outcome: Progressing

## 2024-03-05 NOTE — PROGRESS NOTES
NEUROSURGERY POST-OP PROGRESS NOTE    Patient Name: Richardson Vargas YOB: 1970   Sex: Male Age: 53 yrs     Medical Record Number: [id number] Acct Number: [id number]   Room Number: 5513/5513-01 Hospital Day: Hospital Day: 5     Interval History:  Post-operative Day#32 s/p Procedure(s) (LRB):  THORACIC 5 TO THORACIC 7 LAMINECTOMY WITH DECOMPRESSION (N/A)    Subjective: More and more feeling back in his legs. He still feels like he has a band around his abdomen. Pt also said his urine stream is so much stronger.  Objective:    VITAL SIGNS   BP (!) 148/78   Pulse 68   Temp 97.6 °F (36.4 °C) (Temporal)   Resp 18   Ht 1.829 m (6')   Wt (!) 151 kg (333 lb)   SpO2 96%   BMI 45.16 kg/m²    Height Height: 182.9 cm (6')   Weight Weight - Scale: (!) 151 kg (333 lb)        Allergies Allergies   Allergen Reactions    Lisinopril      Extreme cramps      NPO Status ADULT DIET; Regular   Isolation No active isolations     LABS   Basic Metabolic Profile Recent Labs     03/03/24  0735 03/04/24  0727 03/05/24  0622    139 139    104 103   CO2 22 23 25   BUN 13 14 13   CREATININE 1.0 1.0 0.9   GLUCOSE 186* 220* 209*   MG 2.20 2.10 2.10        Complete Blood Count Recent Labs     03/03/24  0735 03/04/24  0727 03/05/24  0622   WBC 26.9* 22.1* 20.7*   RBC 4.98 4.67 4.73        Coagulation Studies No results for input(s): \"PTT\", \"INR\" in the last 72 hours.    Invalid input(s): \"PLATELETS\", \"PROA\", \"PT\", \"PTTA\"     MEDICATIONS   Inpatient Medications     insulin lispro, 0-4 Units, SubCUTAneous, TID WC    insulin lispro, 0-4 Units, SubCUTAneous, Nightly    sennosides-docusate sodium, 2 tablet, Oral, BID    polyethylene glycol, 17 g, Oral, BID    naloxegol, 12.5 mg, Oral, QAM AC    tamsulosin, 0.4 mg, Oral, Daily    [COMPLETED] dexAMETHasone, 4 mg,  Oral, 4 times per day **FOLLOWED BY** [COMPLETED] dexAMETHasone, 4 mg, Oral, 3 times per day **FOLLOWED BY** dexAMETHasone, 4 mg, Oral, 2 times per day **FOLLOWED BY** [START ON 3/7/2024] dexAMETHasone, 4 mg, Oral, Daily    ceFAZolin, 3,000 mg, IntraVENous, Q8H    sodium chloride flush, 5-40 mL, IntraVENous, 2 times per day    acetaminophen, 650 mg, Oral, Q6H    enoxaparin, 40 mg, SubCUTAneous, BID    metoprolol succinate, 100 mg, Oral, Daily    sodium chloride flush, 5-40 mL, IntraVENous, 2 times per day    pantoprazole, 40 mg, Oral, QAM AC   Infusions    dextrose      sodium chloride      sodium chloride        Antibiotics   Recent Abx Admin                     ceFAZolin Sodium (ANCEF) 3,000 mg in sodium chloride 0.9 % 100 mL (mini-bag) (mg) 3,000 mg New Bag 03/05/24 0347     3,000 mg New Bag 03/04/24 1950     3,000 mg New Bag  1206                     Neurologic Exam:  Mental status: awake and alert and oriented x4      Musculoskeletal:   Gait: Not tested   Tone: normal  Sensory: intact to all extremities  Motor strength:    Right  Left    Right  Left    Deltoid  5 5  Hip Flex  4 4   Biceps  5 5  Knee Extensors  4 4   Triceps  5 5  Knee Flexors  4 4   Wrist Ext  5 5  Ankle Dorsiflex.  5 5   Wrist Flex  5 5  Ankle Plantarflex.  5 5   Handgrip  5 5  Ext Thony Longus  5 5   Thumb Ext  5 5         Incision: intact, clean and dry  Drain:l 70/50   R:90/90    Respiratory:  Unlabored respiratory pattern    Abdomen:   Soft, ND   Last BM 3/4    Cardiovascular:  Warm, well perfused    Assessment   Patient is a 54 yo M s/p Procedure(s) (LRB):  THORACIC 5 TO THORACIC 7 LAMINECTOMY WITH DECOMPRESSION (N/A) per Dr. Benites .     Plan:  Neurologic exam frequency:q4  Mobility:PT/OT   Antibiotics:continue while drain in  Continue flomax  Drain: continue till output < 30  shift  DVT Prophylaxis: SCDs and lovenox  Bowel Regimen: senna and glycolax and movantik  Pain control:thomas   Spasm: valium prn and robaxin prn  Ice to incision

## 2024-03-05 NOTE — PROGRESS NOTES
Pt A/O x 4, VSS on RA. Pt voiding with BRP and bedside urinal. Pt ambulates with walker and GB, gets up x1. Pt tolerating regular diet and fluids. Pt using CPAP machine at night. Pt pain being managed via MAR. No acute events this shift.Pt resting in bed, bed alarm on, bed in lowest position, pts call light within reach.

## 2024-03-05 NOTE — PROGRESS NOTES
Pt A&O x4, VSS.  Pt complains of pain which is being managed per MAR.  Surgical site to back CDI, x2 hemovacs and prevena in place.  Neuro checks remain unchanged, no acute events noted this shift.    Pt is tolerating PO diet and fluids well, voiding adequately via BRP.  Pt is ambulating well x1 walker/ gb assist.    Pt is currently resting in bed with all fall and safety precautions in place, bed locked and in lowest position, call light and belongings within reach.  Pt denies further needs at this time.
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
## 2024-03-06 ENCOUNTER — APPOINTMENT (OUTPATIENT)
Dept: GENERAL RADIOLOGY | Age: 54
End: 2024-03-06
Payer: COMMERCIAL

## 2024-03-06 LAB
ANION GAP SERPL CALCULATED.3IONS-SCNC: 14 MMOL/L (ref 3–16)
BASOPHILS # BLD: 0 K/UL (ref 0–0.2)
BASOPHILS NFR BLD: 0 %
BUN SERPL-MCNC: 20 MG/DL (ref 7–20)
CALCIUM SERPL-MCNC: 8.5 MG/DL (ref 8.3–10.6)
CHLORIDE SERPL-SCNC: 98 MMOL/L (ref 99–110)
CO2 SERPL-SCNC: 22 MMOL/L (ref 21–32)
CREAT SERPL-MCNC: 0.9 MG/DL (ref 0.9–1.3)
DEPRECATED RDW RBC AUTO: 15 % (ref 12.4–15.4)
EOSINOPHIL # BLD: 0 K/UL (ref 0–0.6)
EOSINOPHIL NFR BLD: 0 %
GFR SERPLBLD CREATININE-BSD FMLA CKD-EPI: >60 ML/MIN/{1.73_M2}
GLUCOSE BLD-MCNC: 152 MG/DL (ref 70–99)
GLUCOSE BLD-MCNC: 161 MG/DL (ref 70–99)
GLUCOSE BLD-MCNC: 235 MG/DL (ref 70–99)
GLUCOSE BLD-MCNC: 243 MG/DL (ref 70–99)
GLUCOSE SERPL-MCNC: 158 MG/DL (ref 70–99)
HCT VFR BLD AUTO: 44.6 % (ref 40.5–52.5)
HGB BLD-MCNC: 14.9 G/DL (ref 13.5–17.5)
LYMPHOCYTES # BLD: 1.6 K/UL (ref 1–5.1)
LYMPHOCYTES NFR BLD: 7 %
MAGNESIUM SERPL-MCNC: 2.2 MG/DL (ref 1.8–2.4)
MCH RBC QN AUTO: 28.5 PG (ref 26–34)
MCHC RBC AUTO-ENTMCNC: 33.4 G/DL (ref 31–36)
MCV RBC AUTO: 85.4 FL (ref 80–100)
MONOCYTES # BLD: 1.8 K/UL (ref 0–1.3)
MONOCYTES NFR BLD: 8 %
NEUTROPHILS # BLD: 19.6 K/UL (ref 1.7–7.7)
NEUTROPHILS NFR BLD: 85 %
NRBC BLD-RTO: 2 /100 WBC
PERFORMED ON: ABNORMAL
PLATELET # BLD AUTO: 257 K/UL (ref 135–450)
PMV BLD AUTO: 8.9 FL (ref 5–10.5)
POTASSIUM SERPL-SCNC: 4 MMOL/L (ref 3.5–5.1)
RBC # BLD AUTO: 5.22 M/UL (ref 4.2–5.9)
RBC MORPH BLD: NORMAL
SODIUM SERPL-SCNC: 134 MMOL/L (ref 136–145)
WBC # BLD AUTO: 23.1 K/UL (ref 4–11)

## 2024-03-06 PROCEDURE — 6360000002 HC RX W HCPCS: Performed by: PHYSICIAN ASSISTANT

## 2024-03-06 PROCEDURE — 99024 POSTOP FOLLOW-UP VISIT: CPT | Performed by: NURSE PRACTITIONER

## 2024-03-06 PROCEDURE — 94660 CPAP INITIATION&MGMT: CPT

## 2024-03-06 PROCEDURE — 6370000000 HC RX 637 (ALT 250 FOR IP): Performed by: NURSE PRACTITIONER

## 2024-03-06 PROCEDURE — 97530 THERAPEUTIC ACTIVITIES: CPT

## 2024-03-06 PROCEDURE — 6370000000 HC RX 637 (ALT 250 FOR IP): Performed by: PHYSICIAN ASSISTANT

## 2024-03-06 PROCEDURE — APPNB30 APP NON BILLABLE TIME 0-30 MINS: Performed by: NURSE PRACTITIONER

## 2024-03-06 PROCEDURE — 2580000003 HC RX 258: Performed by: PHYSICIAN ASSISTANT

## 2024-03-06 PROCEDURE — 97535 SELF CARE MNGMENT TRAINING: CPT

## 2024-03-06 PROCEDURE — 97116 GAIT TRAINING THERAPY: CPT

## 2024-03-06 PROCEDURE — 83735 ASSAY OF MAGNESIUM: CPT

## 2024-03-06 PROCEDURE — 6370000000 HC RX 637 (ALT 250 FOR IP)

## 2024-03-06 PROCEDURE — 36415 COLL VENOUS BLD VENIPUNCTURE: CPT

## 2024-03-06 PROCEDURE — 80048 BASIC METABOLIC PNL TOTAL CA: CPT

## 2024-03-06 PROCEDURE — 74018 RADEX ABDOMEN 1 VIEW: CPT

## 2024-03-06 PROCEDURE — 2060000000 HC ICU INTERMEDIATE R&B

## 2024-03-06 PROCEDURE — 85025 COMPLETE CBC W/AUTO DIFF WBC: CPT

## 2024-03-06 PROCEDURE — 2580000003 HC RX 258: Performed by: NEUROLOGICAL SURGERY

## 2024-03-06 PROCEDURE — 6360000002 HC RX W HCPCS: Performed by: NEUROLOGICAL SURGERY

## 2024-03-06 RX ORDER — CHLORPROMAZINE HYDROCHLORIDE 25 MG/1
25 TABLET, FILM COATED ORAL 3 TIMES DAILY PRN
Status: DISCONTINUED | OUTPATIENT
Start: 2024-03-06 | End: 2024-03-07 | Stop reason: HOSPADM

## 2024-03-06 RX ORDER — DEXAMETHASONE 4 MG/1
4 TABLET ORAL DAILY
Qty: 1 TABLET | Refills: 0 | Status: SHIPPED | OUTPATIENT
Start: 2024-03-07 | End: 2024-03-07 | Stop reason: HOSPADM

## 2024-03-06 RX ORDER — TAMSULOSIN HYDROCHLORIDE 0.4 MG/1
0.4 CAPSULE ORAL DAILY
Qty: 30 CAPSULE | Refills: 1 | Status: SHIPPED | OUTPATIENT
Start: 2024-03-07

## 2024-03-06 RX ORDER — METHOCARBAMOL 1000 MG/1
1000 TABLET, COATED ORAL 4 TIMES DAILY PRN
Qty: 30 TABLET | Refills: 0 | Status: SHIPPED | OUTPATIENT
Start: 2024-03-06 | End: 2024-03-16

## 2024-03-06 RX ORDER — OXYCODONE HYDROCHLORIDE 5 MG/1
5 TABLET ORAL EVERY 6 HOURS PRN
Qty: 20 TABLET | Refills: 0 | Status: SHIPPED | OUTPATIENT
Start: 2024-03-06 | End: 2024-03-11

## 2024-03-06 RX ORDER — BISACODYL 10 MG
10 SUPPOSITORY, RECTAL RECTAL DAILY
Status: DISCONTINUED | OUTPATIENT
Start: 2024-03-06 | End: 2024-03-07 | Stop reason: HOSPADM

## 2024-03-06 RX ORDER — PANTOPRAZOLE SODIUM 40 MG/1
40 TABLET, DELAYED RELEASE ORAL
Qty: 7 TABLET | Refills: 0 | Status: SHIPPED | OUTPATIENT
Start: 2024-03-07 | End: 2024-03-14

## 2024-03-06 RX ORDER — ONDANSETRON 4 MG/1
4 TABLET, ORALLY DISINTEGRATING ORAL EVERY 8 HOURS PRN
Qty: 30 TABLET | Refills: 0 | Status: SHIPPED | OUTPATIENT
Start: 2024-03-06 | End: 2024-04-05

## 2024-03-06 RX ORDER — SIMETHICONE 80 MG
160 TABLET,CHEWABLE ORAL 4 TIMES DAILY
Qty: 30 TABLET | Refills: 0 | Status: SHIPPED | OUTPATIENT
Start: 2024-03-06 | End: 2024-03-14

## 2024-03-06 RX ORDER — POLYETHYLENE GLYCOL 3350 17 G/17G
17 POWDER, FOR SOLUTION ORAL DAILY PRN
Qty: 5 PACKET | Refills: 0 | Status: SHIPPED | OUTPATIENT
Start: 2024-03-06 | End: 2024-03-11

## 2024-03-06 RX ORDER — CHLORPROMAZINE HYDROCHLORIDE 25 MG/1
25 TABLET, FILM COATED ORAL 3 TIMES DAILY PRN
Qty: 15 TABLET | Refills: 0 | Status: SHIPPED | OUTPATIENT
Start: 2024-03-06 | End: 2024-03-11

## 2024-03-06 RX ORDER — SIMETHICONE 80 MG
160 TABLET,CHEWABLE ORAL 4 TIMES DAILY
Status: DISCONTINUED | OUTPATIENT
Start: 2024-03-06 | End: 2024-03-07 | Stop reason: HOSPADM

## 2024-03-06 RX ADMIN — OXYCODONE 10 MG: 5 TABLET ORAL at 22:31

## 2024-03-06 RX ADMIN — ACETAMINOPHEN 650 MG: 325 TABLET ORAL at 11:38

## 2024-03-06 RX ADMIN — DEXAMETHASONE 4 MG: 4 TABLET ORAL at 09:04

## 2024-03-06 RX ADMIN — ACETAMINOPHEN 650 MG: 325 TABLET ORAL at 16:39

## 2024-03-06 RX ADMIN — DIAZEPAM 10 MG: 5 TABLET ORAL at 18:37

## 2024-03-06 RX ADMIN — SODIUM CHLORIDE, PRESERVATIVE FREE 10 ML: 5 INJECTION INTRAVENOUS at 21:10

## 2024-03-06 RX ADMIN — ACETAMINOPHEN 650 MG: 325 TABLET ORAL at 21:08

## 2024-03-06 RX ADMIN — OXYCODONE 10 MG: 5 TABLET ORAL at 16:39

## 2024-03-06 RX ADMIN — METHOCARBAMOL 1000 MG: 500 TABLET ORAL at 21:12

## 2024-03-06 RX ADMIN — DIAZEPAM 10 MG: 5 TABLET ORAL at 04:58

## 2024-03-06 RX ADMIN — SODIUM CHLORIDE 3000 MG: 900 INJECTION INTRAVENOUS at 11:43

## 2024-03-06 RX ADMIN — TAMSULOSIN HYDROCHLORIDE 0.4 MG: 0.4 CAPSULE ORAL at 09:04

## 2024-03-06 RX ADMIN — ENOXAPARIN SODIUM 40 MG: 100 INJECTION SUBCUTANEOUS at 09:06

## 2024-03-06 RX ADMIN — HYDROMORPHONE HYDROCHLORIDE 0.5 MG: 1 INJECTION, SOLUTION INTRAMUSCULAR; INTRAVENOUS; SUBCUTANEOUS at 21:08

## 2024-03-06 RX ADMIN — DIAZEPAM 10 MG: 5 TABLET ORAL at 11:38

## 2024-03-06 RX ADMIN — SIMETHICONE 160 MG: 80 TABLET, CHEWABLE ORAL at 14:20

## 2024-03-06 RX ADMIN — SIMETHICONE 160 MG: 80 TABLET, CHEWABLE ORAL at 21:11

## 2024-03-06 RX ADMIN — ACETAMINOPHEN 650 MG: 325 TABLET ORAL at 00:31

## 2024-03-06 RX ADMIN — OXYCODONE 5 MG: 5 TABLET ORAL at 06:45

## 2024-03-06 RX ADMIN — INSULIN LISPRO 1 UNITS: 100 INJECTION, SOLUTION INTRAVENOUS; SUBCUTANEOUS at 16:47

## 2024-03-06 RX ADMIN — SODIUM CHLORIDE 3000 MG: 900 INJECTION INTRAVENOUS at 04:59

## 2024-03-06 RX ADMIN — ACETAMINOPHEN 650 MG: 325 TABLET ORAL at 04:58

## 2024-03-06 RX ADMIN — ENOXAPARIN SODIUM 40 MG: 100 INJECTION SUBCUTANEOUS at 21:10

## 2024-03-06 RX ADMIN — SODIUM CHLORIDE 3000 MG: 900 INJECTION INTRAVENOUS at 22:31

## 2024-03-06 RX ADMIN — SIMETHICONE 160 MG: 80 TABLET, CHEWABLE ORAL at 16:39

## 2024-03-06 RX ADMIN — METHOCARBAMOL 1000 MG: 500 TABLET ORAL at 16:39

## 2024-03-06 ASSESSMENT — PAIN DESCRIPTION - LOCATION
LOCATION: BACK
LOCATION: BACK;LEG
LOCATION: BACK

## 2024-03-06 ASSESSMENT — PAIN SCALES - GENERAL
PAINLEVEL_OUTOF10: 4
PAINLEVEL_OUTOF10: 3
PAINLEVEL_OUTOF10: 9
PAINLEVEL_OUTOF10: 7
PAINLEVEL_OUTOF10: 4
PAINLEVEL_OUTOF10: 1
PAINLEVEL_OUTOF10: 3
PAINLEVEL_OUTOF10: 4
PAINLEVEL_OUTOF10: 6
PAINLEVEL_OUTOF10: 8

## 2024-03-06 ASSESSMENT — PAIN DESCRIPTION - DESCRIPTORS
DESCRIPTORS: ACHING;DISCOMFORT
DESCRIPTORS: ACHING;DISCOMFORT
DESCRIPTORS: ACHING
DESCRIPTORS: ACHING;HEAVINESS

## 2024-03-06 ASSESSMENT — PAIN DESCRIPTION - ORIENTATION
ORIENTATION: MID
ORIENTATION: MID;UPPER
ORIENTATION: MID
ORIENTATION: MID;UPPER
ORIENTATION: MID

## 2024-03-06 ASSESSMENT — PAIN - FUNCTIONAL ASSESSMENT
PAIN_FUNCTIONAL_ASSESSMENT: PREVENTS OR INTERFERES SOME ACTIVE ACTIVITIES AND ADLS
PAIN_FUNCTIONAL_ASSESSMENT: ACTIVITIES ARE NOT PREVENTED
PAIN_FUNCTIONAL_ASSESSMENT: PREVENTS OR INTERFERES SOME ACTIVE ACTIVITIES AND ADLS

## 2024-03-06 ASSESSMENT — PAIN DESCRIPTION - PAIN TYPE
TYPE: SURGICAL PAIN

## 2024-03-06 ASSESSMENT — PAIN DESCRIPTION - FREQUENCY
FREQUENCY: CONTINUOUS

## 2024-03-06 ASSESSMENT — PAIN DESCRIPTION - ONSET
ONSET: ON-GOING

## 2024-03-06 NOTE — PLAN OF CARE
Problem: Safety - Adult  Goal: Free from fall injury  3/6/2024 0925 by Debbi Arias, RN  Outcome: Progressing  All fall precautions in place. Bed locked and in lowest position with alarm on. Overbed table and personal belonings within reach. Call light within reach and patient instructed to use call light for assistance. Non-skid socks on.      Problem: Pain  Goal: Verbalizes/displays adequate comfort level or baseline comfort level  Outcome: Progressing  Pt endorsing pain to back. Being treated with PRN pain medication, rest, and frequent repositioning with pillow support for comfort and pressure relief. Pt reports some relief from pain with above interventions.
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
## 2024-03-06 NOTE — DISCHARGE INSTRUCTIONS
Hospital discharge follow up in 1 week with PCP.   .     The following medications have been changed:  - Please start taking methocarbamol for back pain as directed.  - Please start taking protonix 40mg for 1 week.   - You may take oxycodone for pain every 4 hours as needed. Be cautious to not take it in more than recommended doses, as it can have side effects.  - start   - You may take Zofran for nausea  - You may take movantik and Glycolax as needed to prevent constipation.  - You may take Mylicon, thorazine as needed for indigestion.     Seek medical attention if symptoms worsen/recur.

## 2024-03-06 NOTE — PROGRESS NOTES
difficulty, sensory changes.  Exam:    MRI multilevel severe preforaminal and neuroforaminal stenosis from C2-C7.  Thoracic spine cord myelopathy and severe stenosis with posterior ligamentous hypertrophy at T4-T5.  CT reveals moderate spinal canal stenosis.  -Neurosurgery consulted; Plan for laminectomy 3/2/24; OR time TBD  -(3/02) T4-T7 bilateral laminectomy and decompression by Dr. Dean; patient tolerated procedure well  -Continue Decadron 4 Mg every 6 hours; taper as directed  -Continue Protonix 40 Mg daily  -Neurochecks q4hr  - Closed suction drain; f/u neurosurgery recs; per Nueurosurgery not planning d/c until drain<30 ml q shift  -PT OT scores 17; d/c recommendation home with home health care and PT  -Robaxin 1000 mg every 6 hours PRN, Dilaudid 0.5 Mg Q3HR PRN oxycodone 5 Mg Q4hr PRN  -Zofran for nausea     GI disturbance: Flatulence/ hiccups  Patient reported gaseous symptoms  - started GI cocktail PRN   - started chlorpromazine PRN    Elevated blood Glucose  Lekocytosis, likely reactive  Elevated WBC and blood glucose. Patient is on decadron, likely reactive  - monitor for signs of infection  - (3/4) started LDSSI, patient requiring minimum insulin    Chronic conditions:  Hypertension: Continue home metoprolol  GERD: Patient not taking Prilosec anymore  BERNADINE: Patient will get home BiPAP  Obesity: BMI of 45;     DVT PPx: Lovenox  Diet: ADULT DIET; Regular   Code status:  Full Code     ELOS: 3  Barriers to discharge:  Disposition  - Preadmission: home  - Current: [facility]  - Upon discharge: TBD    Will discuss with attending physician Sahara Lynn MD     _____________________  Jordi Ray MD   Internal Medicine Resident, PGY-1

## 2024-03-06 NOTE — PROGRESS NOTES
NEUROSURGERY POST-OP PROGRESS NOTE    Patient Name: Richardson Vragas YOB: 1970   Sex: Male Age: 53 yrs     Medical Record Number: [id number] Acct Number: [id number]   Room Number: 5513/5513-01 Hospital Day: Hospital Day: 6     Interval History:  Post-operative Day#4 s/p Procedure(s) (LRB):  THORACIC 5 TO THORACIC 7 LAMINECTOMY WITH DECOMPRESSION (N/A)    Subjective: Feeling is increasing in his legs as well as strength. He still feels like he has a band around his abdomen. He feels likje he has a lot of gas and has only had liquid stool out. He also has hiccups.  .  Objective:    VITAL SIGNS   BP (!) 145/71   Pulse 61   Temp 97.6 °F (36.4 °C) (Oral)   Resp 18   Ht 1.829 m (6')   Wt (!) 151 kg (333 lb)   SpO2 94%   BMI 45.16 kg/m²    Height Height: 182.9 cm (6')   Weight Weight - Scale: (!) 151 kg (333 lb)        Allergies Allergies   Allergen Reactions    Lisinopril      Extreme cramps      NPO Status ADULT DIET; Regular   Isolation No active isolations     LABS   Basic Metabolic Profile Recent Labs     03/04/24  0727 03/05/24  0622 03/06/24  0820    139 134*    103 98*   CO2 23 25 22   BUN 14 13 20   CREATININE 1.0 0.9 0.9   GLUCOSE 220* 209* 158*   MG 2.10 2.10 2.20        Complete Blood Count Recent Labs     03/04/24  0727 03/05/24  0622 03/06/24  0820   WBC 22.1* 20.7* 23.1*   RBC 4.67 4.73 5.22        Coagulation Studies No results for input(s): \"PTT\", \"INR\" in the last 72 hours.    Invalid input(s): \"PLATELETS\", \"PROA\", \"PT\", \"PTTA\"     MEDICATIONS   Inpatient Medications     simethicone, 160 mg, Oral, 4x Daily    insulin lispro, 0-4 Units, SubCUTAneous, TID WC    insulin lispro, 0-4 Units, SubCUTAneous, Nightly    sennosides-docusate sodium, 2 tablet, Oral, BID    polyethylene glycol, 17 g, Oral, BID    naloxegol,  12.5 mg, Oral, QAM AC    tamsulosin, 0.4 mg, Oral, Daily    [COMPLETED] dexAMETHasone, 4 mg, Oral, 4 times per day **FOLLOWED BY** [COMPLETED] dexAMETHasone, 4 mg, Oral, 3 times per day **FOLLOWED BY** [COMPLETED] dexAMETHasone, 4 mg, Oral, 2 times per day **FOLLOWED BY** [START ON 3/7/2024] dexAMETHasone, 4 mg, Oral, Daily    ceFAZolin, 3,000 mg, IntraVENous, Q8H    sodium chloride flush, 5-40 mL, IntraVENous, 2 times per day    acetaminophen, 650 mg, Oral, Q6H    enoxaparin, 40 mg, SubCUTAneous, BID    metoprolol succinate, 100 mg, Oral, Daily    sodium chloride flush, 5-40 mL, IntraVENous, 2 times per day    pantoprazole, 40 mg, Oral, QAM AC   Infusions    dextrose      sodium chloride      sodium chloride        Antibiotics   Recent Abx Admin                     ceFAZolin Sodium (ANCEF) 3,000 mg in sodium chloride 0.9 % 100 mL (mini-bag) (mg) 3,000 mg New Bag 03/06/24 1143     3,000 mg New Bag  0459     3,000 mg New Bag 03/05/24 2018                     Neurologic Exam:  Mental status: awake and alert and oriented x4      Musculoskeletal:   Gait: Not tested   Tone: normal  Sensory: intact to all extremities  Motor strength:    Right  Left    Right  Left    Deltoid  5 5  Hip Flex  4 4   Biceps  5 5  Knee Extensors  4 4   Triceps  5 5  Knee Flexors  5 5   Wrist Ext  5 5  Ankle Dorsiflex.  5 5   Wrist Flex  5 5  Ankle Plantarflex.  5 5   Handgrip  5 5  Ext Thony Longus  5 5   Thumb Ext  5 5         Incision: intact, clean and dry  Drain:l 90/40/20   R:120/20/50  Wound vac in place    Respiratory:  Unlabored respiratory pattern    Abdomen:   Soft, ND   Last BM 3/6    Cardiovascular:  Warm, well perfused    Assessment   Patient is a 52 yo M s/p Procedure(s) (LRB):  THORACIC 5 TO THORACIC 7 LAMINECTOMY WITH DECOMPRESSION (N/A) per Dr. Benites .     Plan:  Neurologic exam frequency:q4  Mobility:PT/OT   Antibiotics:continue while drain in  Continue flomax  Drain: continue till output < 30  shift, will recheck at 2 and

## 2024-03-06 NOTE — PROGRESS NOTES
at end of session.  Orientation  Overall Orientation Status: Within Functional Limits  Orientation Level: Oriented X4  Cognition  Overall Cognitive Status: WFL     Objective   Vitals     Bed Mobility Training  Bed Mobility Training: No (patient up in bathroom with RN staff upon PT entry, sitting up in bedside chair at end of session)  Sit to Supine: Stand-by assistance;Additional time  Balance  Sitting: Intact  Standing: With support (SBA with UE support on walker)  Transfer Training  Transfer Training: Yes  Sit to Stand: Stand-by assistance (from EOB)  Stand to Sit: Stand-by assistance (to EOB and to bedside chair, verbal cues for safety and reach back)  Toilet Transfer: Stand-by assistance (ambulating, regular toilet)  Gait  Gait Training: Yes  Overall Level of Assistance: Stand-by assistance  Distance (ft): 140 Feet  Assistive Device: Gait belt;Walker, rolling  Gait Abnormalities:  (gait steady with no overt loss of balance noted, wide base of support, decreased step length/height bilaterally, mild ataxia noted in BLEs)           Safety Devices  Type of Devices: Call light within reach;Nurse notified;Left in chair;Chair alarm in place;Gait belt       Goals  Short Term Goals  Time Frame for Short Term Goals: Discharge  Short Term Goal 1: Pt will perform all bed mobility with supervision.  Ongoing  Short Term Goal 2: Pt will perform sit to/from stand with RW and supervision.  Ongoing  Short Term Goal 3: Pt will ambulate 150' with RW and supervision.  Ongoing  Short Term Goal 4: Pt will ascend/descend 12 stairs with BHR and SBA. Ongoing  Patient Goals   Patient Goals : none stated    Education  Patient Education  Education Given To: Patient;Family  Education Provided: Role of Therapy;Plan of Care  Education Method: Verbal  Barriers to Learning: None  Education Outcome: Verbalized understanding    AM-PAC - Mobility    AM-PAC Basic Mobility - Inpatient   How much help is needed turning from your back to your side  while in a flat bed without using bedrails?: A Little  How much help is needed moving from lying on your back to sitting on the side of a flat bed without using bedrails?: A Little  How much help is needed moving to and from a bed to a chair?: A Little  How much help is needed standing up from a chair using your arms?: A Little  How much help is needed walking in hospital room?: A Little  How much help is needed climbing 3-5 steps with a railing?: A Little  AM-[facility] Inpatient Mobility Raw Score : 18  AM-PAC Inpatient T-Scale Score : 43.63  Mobility Inpatient CMS 0-100% Score: 46.58  Mobility Inpatient CMS G-Code Modifier : CK         Therapy Time   Individual Concurrent Group Co-treatment   Time In 0806         Time Out 0833         Minutes 27         Timed Code Treatment Minutes: 27 Minutes     If patient discharges prior to next session this note will serve as a discharge summary.  Please see below for the latest assessment towards goals.      Alma Daly, [place] [id number]

## 2024-03-06 NOTE — Clinical Note
wean     His main complaint was poor sleep due to constant interruptions.     He is on a CPAP machine for sleep apnea        DVT prophylaxis: Chem prophylaxis, mobilize.            Disposition:   OR 3/2/24  PT/OT: 24 hour supervision or assist, Home with Home health PT . Possible DC in 1-2 days pending removal of drains/wound care plans. Appears NSGY requesting Acute Rehab eval.         Sahara Regalado MD

## 2024-03-06 NOTE — DISCHARGE INSTR - COC
Continuity of Care Form    Patient Name: Richardson Vargas   :  1970  MRN:  [id number]    Admit date:  3/1/2024  Discharge date:  ***    Code Status Order: Full Code   Advance Directives:     Admitting Physician:  Moose Fernández MD  PCP: Ros Salmon APRN - CNP    Discharging Nurse: ***  Discharging Hospital Unit/Room#: 5513/5513-01  Discharging Unit Phone Number: ***    Emergency Contact:   Extended Emergency Contact Information  Primary Emergency Contact: Alicia,Tiffanie  Address: [address] [place] [address]  Home Phone: [phone]  Mobile Phone: [phone]  Relation: Spouse  Secondary Emergency Contact: Deepa Vargas  Home Phone: [phone]  Relation: Parent    Past Surgical History:  Past Surgical History:   Procedure Laterality Date    COLONOSCOPY  2013    polyp    HERNIA REPAIR      LAMINECTOMY N/A 3/2/2024    THORACIC 5 TO THORACIC 7 LAMINECTOMY WITH DECOMPRESSION performed by Bebeto Dean MD at [facility] OR    TONSILLECTOMY AND ADENOIDECTOMY  child    UPPER GASTROINTESTINAL ENDOSCOPY  2013    H/H    UPPER GASTROINTESTINAL ENDOSCOPY  2016       Immunization History:   Immunization History   Administered Date(s) Administered    COVID-19, MODERNA BLUE border, Primary or Immunocompromised, (age 12y+), IM, 100 mcg/0.5mL 2021, 2021    Hepatitis A 2001, 2003, 2003    Hepatitis A, Ped/adol Dosage, 3 Dose (Historical) 2003    Hepatitis B 2008, 2008, 2008, 10/09/2008    Hepatitis B vaccine 2008    Influenza Vaccine, unspecified formulation 10/16/2013, 2015    Influenza Virus Vaccine 10/16/2013, 2017    Influenza, AFLURIA (age 3 yrs+), FLUZONE, (age 6 mo+), MDV, 0.5mL 2017, 2018    Influenza, FLUCELVAX, (age 6 mo+), MDCK, PF, 0.5mL 2022, 10/16/2023    Influenza, Intradermal, Preservative free 2015, 10/13/2016    TDaP, ADACEL (age 10y-64y),    Address:  Phone:  Fax:    Dialysis Facility (if applicable)   Name:  Address:  Dialysis Schedule:  Phone:  Fax:    / signature: {Esignature:[id number]}    PHYSICIAN SECTION    Prognosis: Good    Condition at Discharge: Stable    Rehab Potential (if transferring to Rehab): Good    Recommended Labs or Other Treatments After Discharge: PT    Physician Certification: I certify the above information and transfer of Richardson Vargas  is necessary for the continuing treatment of the diagnosis listed and that he requires Home Care for less 30 days.     Update Admission H&P: No change in H&P    PHYSICIAN SIGNATURE:  Electronically signed by Jordi Ray MD on 3/6/24 at 12:43 PM EST

## 2024-03-06 NOTE — PLAN OF CARE
Problem: Safety - Adult  Goal: Free from fall injury  Outcome: Progressing   All fall precautions are in place. The bed locked and in its lowest position. The bed alarm is set and the pt has been reminded to call out prior to getting up. The overhead table with their personal belongings are within reach. The call bell is lying in the bed with this pt and easy to reach and use.    Problem: Skin/Tissue Integrity  Goal: Absence of new skin breakdown  Description: 1.  Monitor for areas of redness and/or skin breakdown  2.  Assess vascular access sites hourly  3.  Every 4-6 hours minimum:  Change oxygen saturation probe site  4.  Every 4-6 hours:  If on nasal continuous positive airway pressure, respiratory therapy assess nares and determine need for appliance change or resting period.  Outcome: Progressing     Problem: Skin/Tissue Integrity  Goal: Absence of new skin breakdown  Description: 1.  Monitor for areas of redness and/or skin breakdown  2.  Assess vascular access sites hourly  3.  Every 4-6 hours minimum:  Change oxygen saturation probe site  4.  Every 4-6 hours:  If on nasal continuous positive airway pressure, respiratory therapy assess nares and determine need for appliance change or resting period.  Outcome: Progressing   Skin assessment has been performed this shift. This assessment revealed that their skin is C/D/I. This pt has turned/offloaded their bony areas q2h and for their comfort.

## 2024-03-06 NOTE — PROGRESS NOTES
Pt A&O x4, VSS.  Pt complains of intermittent pain which is being managed per MAR.  Surgical site CDI with prevena and x2 hemovacs in place.  Neuro checks remain unchanged, no acute events noted this shift.    Pt is tolerating PO diet and fluids well, voiding adequately via BRP.  Also ambulating well x1 walker/gb assist.    Pt is currently resting in bed with all fall and safety precautions in place, bed locked and in lowest position, call light and belongings within reach.  Pt denies further needs at this time.  Family at bedside.

## 2024-03-07 VITALS
DIASTOLIC BLOOD PRESSURE: 84 MMHG | RESPIRATION RATE: 15 BRPM | HEIGHT: 72 IN | TEMPERATURE: 97.9 F | HEART RATE: 63 BPM | SYSTOLIC BLOOD PRESSURE: 156 MMHG | BODY MASS INDEX: 42.66 KG/M2 | OXYGEN SATURATION: 96 % | WEIGHT: 315 LBS

## 2024-03-07 LAB
ANION GAP SERPL CALCULATED.3IONS-SCNC: 8 MMOL/L (ref 3–16)
BASOPHILS # BLD: 0 K/UL (ref 0–0.2)
BASOPHILS NFR BLD: 0 %
BUN SERPL-MCNC: 16 MG/DL (ref 7–20)
CALCIUM SERPL-MCNC: 7.7 MG/DL (ref 8.3–10.6)
CHLORIDE SERPL-SCNC: 101 MMOL/L (ref 99–110)
CO2 SERPL-SCNC: 27 MMOL/L (ref 21–32)
CREAT SERPL-MCNC: 0.8 MG/DL (ref 0.9–1.3)
DEPRECATED RDW RBC AUTO: 14.8 % (ref 12.4–15.4)
EOSINOPHIL # BLD: 0 K/UL (ref 0–0.6)
EOSINOPHIL NFR BLD: 0 %
GFR SERPLBLD CREATININE-BSD FMLA CKD-EPI: >60 ML/MIN/{1.73_M2}
GLUCOSE BLD-MCNC: 154 MG/DL (ref 70–99)
GLUCOSE BLD-MCNC: 190 MG/DL (ref 70–99)
GLUCOSE SERPL-MCNC: 162 MG/DL (ref 70–99)
HCT VFR BLD AUTO: 40.1 % (ref 40.5–52.5)
HGB BLD-MCNC: 13.5 G/DL (ref 13.5–17.5)
LYMPHOCYTES # BLD: 2.2 K/UL (ref 1–5.1)
LYMPHOCYTES NFR BLD: 13 %
MAGNESIUM SERPL-MCNC: 2.3 MG/DL (ref 1.8–2.4)
MCH RBC QN AUTO: 28.6 PG (ref 26–34)
MCHC RBC AUTO-ENTMCNC: 33.6 G/DL (ref 31–36)
MCV RBC AUTO: 85.2 FL (ref 80–100)
MONOCYTES # BLD: 0.9 K/UL (ref 0–1.3)
MONOCYTES NFR BLD: 5 %
NEUTROPHILS # BLD: 14.1 K/UL (ref 1.7–7.7)
NEUTROPHILS NFR BLD: 82 %
PERFORMED ON: ABNORMAL
PERFORMED ON: ABNORMAL
PLATELET # BLD AUTO: 211 K/UL (ref 135–450)
PMV BLD AUTO: 8.7 FL (ref 5–10.5)
POTASSIUM SERPL-SCNC: 3.8 MMOL/L (ref 3.5–5.1)
RBC # BLD AUTO: 4.71 M/UL (ref 4.2–5.9)
RBC MORPH BLD: NORMAL
SODIUM SERPL-SCNC: 136 MMOL/L (ref 136–145)
WBC # BLD AUTO: 17.2 K/UL (ref 4–11)

## 2024-03-07 PROCEDURE — 94660 CPAP INITIATION&MGMT: CPT

## 2024-03-07 PROCEDURE — 99024 POSTOP FOLLOW-UP VISIT: CPT | Performed by: NURSE PRACTITIONER

## 2024-03-07 PROCEDURE — 80048 BASIC METABOLIC PNL TOTAL CA: CPT

## 2024-03-07 PROCEDURE — 83735 ASSAY OF MAGNESIUM: CPT

## 2024-03-07 PROCEDURE — 6360000002 HC RX W HCPCS: Performed by: PHYSICIAN ASSISTANT

## 2024-03-07 PROCEDURE — 2580000003 HC RX 258: Performed by: NEUROLOGICAL SURGERY

## 2024-03-07 PROCEDURE — 6370000000 HC RX 637 (ALT 250 FOR IP): Performed by: PHYSICIAN ASSISTANT

## 2024-03-07 PROCEDURE — APPNB30 APP NON BILLABLE TIME 0-30 MINS: Performed by: NURSE PRACTITIONER

## 2024-03-07 PROCEDURE — 6370000000 HC RX 637 (ALT 250 FOR IP): Performed by: NURSE PRACTITIONER

## 2024-03-07 PROCEDURE — 6370000000 HC RX 637 (ALT 250 FOR IP)

## 2024-03-07 PROCEDURE — 36415 COLL VENOUS BLD VENIPUNCTURE: CPT

## 2024-03-07 PROCEDURE — 94761 N-INVAS EAR/PLS OXIMETRY MLT: CPT

## 2024-03-07 PROCEDURE — 6360000002 HC RX W HCPCS: Performed by: NEUROLOGICAL SURGERY

## 2024-03-07 PROCEDURE — 2580000003 HC RX 258: Performed by: PHYSICIAN ASSISTANT

## 2024-03-07 PROCEDURE — 85025 COMPLETE CBC W/AUTO DIFF WBC: CPT

## 2024-03-07 RX ADMIN — ACETAMINOPHEN 650 MG: 325 TABLET ORAL at 03:41

## 2024-03-07 RX ADMIN — OXYCODONE 10 MG: 5 TABLET ORAL at 13:28

## 2024-03-07 RX ADMIN — DIAZEPAM 10 MG: 5 TABLET ORAL at 03:41

## 2024-03-07 RX ADMIN — SIMETHICONE 160 MG: 80 TABLET, CHEWABLE ORAL at 13:28

## 2024-03-07 RX ADMIN — PANTOPRAZOLE SODIUM 40 MG: 40 TABLET, DELAYED RELEASE ORAL at 06:33

## 2024-03-07 RX ADMIN — DEXAMETHASONE 4 MG: 4 TABLET ORAL at 09:18

## 2024-03-07 RX ADMIN — OXYCODONE 10 MG: 5 TABLET ORAL at 03:41

## 2024-03-07 RX ADMIN — SODIUM CHLORIDE 3000 MG: 900 INJECTION INTRAVENOUS at 03:47

## 2024-03-07 RX ADMIN — SODIUM CHLORIDE, PRESERVATIVE FREE 10 ML: 5 INJECTION INTRAVENOUS at 09:20

## 2024-03-07 RX ADMIN — NALOXEGOL OXALATE 12.5 MG: 12.5 TABLET, FILM COATED ORAL at 06:33

## 2024-03-07 RX ADMIN — METHOCARBAMOL 1000 MG: 500 TABLET ORAL at 11:33

## 2024-03-07 RX ADMIN — SIMETHICONE 160 MG: 80 TABLET, CHEWABLE ORAL at 09:18

## 2024-03-07 RX ADMIN — OXYCODONE 10 MG: 5 TABLET ORAL at 09:19

## 2024-03-07 RX ADMIN — DIAZEPAM 10 MG: 5 TABLET ORAL at 16:26

## 2024-03-07 RX ADMIN — ENOXAPARIN SODIUM 40 MG: 100 INJECTION SUBCUTANEOUS at 09:19

## 2024-03-07 RX ADMIN — ACETAMINOPHEN 650 MG: 325 TABLET ORAL at 09:18

## 2024-03-07 RX ADMIN — DIAZEPAM 10 MG: 5 TABLET ORAL at 09:19

## 2024-03-07 RX ADMIN — SODIUM CHLORIDE 3000 MG: 900 INJECTION INTRAVENOUS at 12:20

## 2024-03-07 RX ADMIN — TAMSULOSIN HYDROCHLORIDE 0.4 MG: 0.4 CAPSULE ORAL at 09:18

## 2024-03-07 RX ADMIN — METOPROLOL SUCCINATE 100 MG: 100 TABLET, EXTENDED RELEASE ORAL at 09:18

## 2024-03-07 ASSESSMENT — PAIN SCALES - GENERAL
PAINLEVEL_OUTOF10: 7
PAINLEVEL_OUTOF10: 7
PAINLEVEL_OUTOF10: 4
PAINLEVEL_OUTOF10: 4
PAINLEVEL_OUTOF10: 5
PAINLEVEL_OUTOF10: 7

## 2024-03-07 ASSESSMENT — PAIN DESCRIPTION - PAIN TYPE
TYPE: SURGICAL PAIN
TYPE: SURGICAL PAIN

## 2024-03-07 ASSESSMENT — PAIN DESCRIPTION - LOCATION
LOCATION: BACK
LOCATION: BACK
LOCATION: BACK;LEG

## 2024-03-07 ASSESSMENT — PAIN DESCRIPTION - DESCRIPTORS
DESCRIPTORS: ACHING;DISCOMFORT
DESCRIPTORS: ACHING
DESCRIPTORS: ACHING;DISCOMFORT

## 2024-03-07 ASSESSMENT — PAIN DESCRIPTION - ORIENTATION
ORIENTATION: UPPER;MID
ORIENTATION: UPPER
ORIENTATION: UPPER

## 2024-03-07 ASSESSMENT — PAIN DESCRIPTION - ONSET: ONSET: ON-GOING

## 2024-03-07 ASSESSMENT — PAIN DESCRIPTION - FREQUENCY: FREQUENCY: CONTINUOUS

## 2024-03-07 ASSESSMENT — PAIN - FUNCTIONAL ASSESSMENT
PAIN_FUNCTIONAL_ASSESSMENT: PREVENTS OR INTERFERES SOME ACTIVE ACTIVITIES AND ADLS
PAIN_FUNCTIONAL_ASSESSMENT: ACTIVITIES ARE NOT PREVENTED

## 2024-03-07 NOTE — PROGRESS NOTES
NEUROSURGERY POST-OP PROGRESS NOTE    Patient Name: Richardson Vargas YOB: 1970   Sex: Male Age: 53 yrs     Medical Record Number: [id number] Acct Number: [id number]   Room Number: 5513/5513-01 Hospital Day: Hospital Day: 7     Interval History:  Post-operative Day# 5 s/p Procedure(s) (LRB):  THORACIC 5 TO THORACIC 7 LAMINECTOMY WITH DECOMPRESSION (N/A)    Subjective: Feeling is increasing in his legs as well as strength.  still feels like he has a band around his abdomen. Hiccups better. Gas better.  Objective:    VITAL SIGNS   /75   Pulse 67   Temp 98 °F (36.7 °C) (Axillary)   Resp 16   Ht 1.829 m (6')   Wt (!) 151 kg (333 lb)   SpO2 96%   BMI 45.16 kg/m²    Height Height: 182.9 cm (6')   Weight Weight - Scale: (!) 151 kg (333 lb)        Allergies Allergies   Allergen Reactions    Lisinopril      Extreme cramps      NPO Status ADULT DIET; Regular   Isolation No active isolations     LABS   Basic Metabolic Profile Recent Labs     03/05/24  0622 03/06/24  0820 03/07/24  0702    134* 136    98* 101   CO2 25 22 27   BUN 13 20 16   CREATININE 0.9 0.9 0.8*   GLUCOSE 209* 158* 162*   MG 2.10 2.20 2.30        Complete Blood Count Recent Labs     03/05/24  0622 03/06/24  0820 03/07/24  0702   WBC 20.7* 23.1* 17.2*   RBC 4.73 5.22 4.71        Coagulation Studies No results for input(s): \"PTT\", \"INR\" in the last 72 hours.    Invalid input(s): \"PLATELETS\", \"PROA\", \"PT\", \"PTTA\"     MEDICATIONS   Inpatient Medications     simethicone, 160 mg, Oral, 4x Daily    bisacodyl, 10 mg, Rectal, Daily    insulin lispro, 0-4 Units, SubCUTAneous, TID WC    insulin lispro, 0-4 Units, SubCUTAneous, Nightly    sennosides-docusate sodium, 2 tablet, Oral, BID    polyethylene glycol, 17 g, Oral, BID    naloxegol, 12.5 mg, Oral, QAM AC

## 2024-03-07 NOTE — DISCHARGE SUMMARY
INTERNAL MEDICINE DEPARTMENT AT THE [facility]  DISCHARGE SUMMARY    Patient ID: Richardson Vargas                                             Discharge Date: 3/7/2024   Patient's PCP: Ros Salmon APRN - ARE[facility]                                          Discharge Physician: Jordi Ray MD MD  Admit Date: 3/1/2024   Admitting Physician: Moose Fernández MD    PROBLEMS DURING HOSPITALIZATION:  Present on Admission:   Alteration in neurological status      DISCHARGE DIAGNOSES:    # Spinal cord myelopathy and severe stenosis C2-C7; and T4-T5  # GI disturbance: Flatulence/ hiccups   # Elevated blood Glucose  # Lekocytosis, likely reactive      HPI:  Richardson Vargas is a 53 yoM with a PMHx of HTN, GERD, BERNADINE, low back pain here for progressive bilateral LE weakness over the past month, associated with decreased ability to initiate urination and some subjective sensory loss from mid-torso distally. He reports associated sensation that he is wearing compression clothing in those regions. He says he has not tried anything for his sx and has not noticed any particular position that would relieve his sx. Pt now uses a cane 2/2 weakness. [facility]pt [facility] today who recommended to come for emergent MRI for concerns of cord myelopathy. Pt also endorses lowe back pain that was midline, mild and slightly worse since seeing a chiropractor at the beginning of the month for his sx.     The pt presented to [facility] one month ago for sx of difficulty and initial MRI at [facility] 2/15 that did not show any definitive issues that could be causing weakness at that time.     ED Course:  INITIAL VITALS: BP: (!) 179/83, Temp: 98.3 °F (36.8 °C), Pulse: 61, Respirations: 16, SpO2: 98 %    NSGY consulted, pt sent for imaging, made NPO at midnight. No other labs or tests done at that time.  MRI CERVICAL SPINE: Multilevel severe pre foraminal and neural foraminal stenosis, on the right at C3-4 and to a lesser degree at C2-3.  mouth daily as needed for Constipation     simethicone 80 MG chewable tablet  Commonly known as: MYLICON  Take 2 tablets by mouth 4 times daily for 8 days     tamsulosin 0.4 MG capsule  Commonly known as: FLOMAX  Take 1 capsule by mouth daily            CONTINUE taking these medications      metoprolol succinate 100 MG extended release tablet  Commonly known as: TOPROL XL  TAKE 1 TABLET BY MOUTH DAILY     sucralfate 1 GM tablet  Commonly known as: Carafate  Take 1 tablet by mouth 4 times daily            STOP taking these medications      celecoxib 100 MG capsule  Commonly known as: CELEBREX     omeprazole 20 MG delayed release capsule  Commonly known as: PRILOSEC               Where to Get Your Medications        These medications were sent to [facility] [id number] [facility] [address] [facility] [address] - P [phone] - F [phone] [address] [address]      Phone: [phone]   chlorproMAZINE 25 MG tablet  Methocarbamol 1000 MG Tabs  naloxegol 12.5 MG Tabs tablet  ondansetron 4 MG disintegrating tablet  oxyCODONE 5 MG immediate release tablet  pantoprazole 40 MG tablet  polyethylene glycol 17 g packet  simethicone 80 MG chewable tablet  tamsulosin 0.4 MG capsule       Activity: as directed by neurosurgery  Diet: regular diet  Wound Care: as directed    Time Spent on discharge is more than 30 minutes    Signed:  Jordi Ray MD,  MD, PGY-1  3/7/2024

## 2024-03-07 NOTE — PLAN OF CARE
Problem: Discharge Planning  Goal: Discharge to home or other facility with appropriate resources  Outcome: Completed     Problem: Safety - Adult  Goal: Free from fall injury  Outcome: Completed     Problem: Pain  Goal: Verbalizes/displays adequate comfort level or baseline comfort level  Outcome: Completed     Problem: ABCDS Injury Assessment  Goal: Absence of physical injury  Outcome: Completed     Problem: Skin/Tissue Integrity  Goal: Absence of new skin breakdown  Description: 1.  Monitor for areas of redness and/or skin breakdown  2.  Assess vascular access sites hourly  3.  Every 4-6 hours minimum:  Change oxygen saturation probe site  4.  Every 4-6 hours:  If on nasal continuous positive airway pressure, respiratory therapy assess nares and determine need for appliance change or resting period.  Outcome: Completed

## 2024-03-07 NOTE — PROGRESS NOTES
Pt is A&O x4, VSS, RA. C/o pain to back and legs, pain is being managed with pain medicine per MAR. Incision site CDI. B/L hemovacs drain and pravena in place. Up x1 with walker and GB. Denies any N/V, no any acute changes. Resting comfortably at this time. All fall precaution is in place. Call light is within the reach.

## 2024-03-07 NOTE — PLAN OF CARE
Problem: Discharge Planning  Goal: Discharge to home or other facility with appropriate resources  Outcome: Progressing     Problem: Safety - Adult  Goal: Free from fall injury  3/6/2024 1927 by Colt Mcgregor RN  Outcome: Progressing    Fall risk precaution in place. Bed is locked and in lowest position. 2/4 side rails are up. Call light with in reach. Fall risk bracelet in place, non slip socks on.frequent check on patient. free from falls at this time.will continue to monitor.       Problem: Pain  Goal: Verbalizes/displays adequate comfort level or baseline comfort level  3/6/2024 1927 by Colt Mcgregor, RN  Outcome: Progressing     Problem: ABCDS Injury Assessment  Goal: Absence of physical injury  Outcome: Progressing     Problem: Skin/Tissue Integrity  Goal: Absence of new skin breakdown  Description: 1.  Monitor for areas of redness and/or skin breakdown  2.  Assess vascular access sites hourly  3.  Every 4-6 hours minimum:  Change oxygen saturation probe site  4.  Every 4-6 hours:  If on nasal continuous positive airway pressure, respiratory therapy assess nares and determine need for appliance change or resting period.  Outcome: Progressing

## 2024-03-08 ENCOUNTER — TELEPHONE (OUTPATIENT)
Dept: FAMILY MEDICINE CLINIC | Age: 54
End: 2024-03-08

## 2024-03-08 NOTE — TELEPHONE ENCOUNTER
Patient has been In patient for over a week, had spinal surgery and was d/c home. He called because he states he missed a call from our office. TCM Outreach was unable to leave a message because VM full.   He is asking for medication refills of what he was prescribed in the hospital. He states he was rotating valium alternated with methocarbamol.  I instructed him to call [facility] to ask about refills, since he is following up with them Post-op.He does have follow up with surgical group on 3/19/24 [facility]    Offered to schedule hospital follow up, but he has PT scheduled multiple times. He has difficulty with transportation and mobilization. Offered virtual, and he states he could do that, but unsure of his other appointments that may conflict.     Please advise. He would also like a call back from Nurse .

## 2024-03-08 NOTE — TELEPHONE ENCOUNTER
Patient called back and states Colby was able to take care of his medications.   He will contact will the office next week to schedule hospital hospital follow up.

## 2024-03-08 NOTE — TELEPHONE ENCOUNTER
Care Transitions Initial Follow Up Call    Outreach made within 2 business days of discharge: Yes    Patient: Richardson Vargas Patient : 1970   MRN: [id number]  Reason for Admission: There are no discharge diagnoses documented for the most recent discharge.  Discharge Date: 3/7/24       Spoke with: no answer and vm full

## 2024-03-11 NOTE — CARE COORDINATION
Case Management Assessment            Discharge Note                    Date / Time of Note: 3/7/2024 2:22 PM                  Discharge Note Completed by: Thais Raines RN    Patient Name: Richardson Vargas   YOB: 1970  Diagnosis: Myelopathy of thoracic region [M47.14]  Alteration in neurological status [R29.90]   Date / Time: 3/1/2024  4:33 PM    Current PCP: Ros Salmon APRN - CNP  Clinic patient: No    Hospitalization in the last 30 days: No       Advance Directives:  Code Status: Full Code  [place] DNR form completed and on chart: Not Indicated    Financial:  Payor: AETNA / Plan: AETNA / Product Type: *No Product type* /      Ph[facility]    [id number] [place], [place] - [address] [facility] [address] - P [phone] - F [phone] [address] [facility] [address]  [facility] [address]  Phone: [phone] Fax: [phone]      Assistance purchasing medications?:    Assistance provided by Case Management: None at this time    Does patient want to participate in local refill/ meds to beds program?: Yes    Meds To Beds General Rules:  1. Can ONLY be done Monday- Friday between 8:30am-5pm  2. Prescription(s) must be in pharmacy by 3pm to be filled same day  3.Copy of patient's insurance/ prescription drug card and patient face sheet must be sent along with the prescription(s)  4. Cost of Rx cannot be added to hospital bill. If financial assistance is needed, please contact unit  or ;  or  CANNOT provide pharmacy voucher for patients co-pays  5. Patients can then  the prescription on their way out of the hospital at discharge, or pharmacy can deliver to the bedside if staff is available. (payment due at time of pick-up or delivery - cash, check, or card accepted)     Able to afford home medications/ co-pay costs: No    ADLS:  Current PT AM-PAC Score: 18 /24  Current OT AM-PAC Score: 19 /24      DISCHARGE Disposition: Home with Home 
        Case Management Assessment           Daily Note                 Date/ Time of Note: 3/5/2024 2:58 PM         Note completed by: Yamileth Friedman RN    Patient Name: Richardson Vargas  YOB: 1970    Diagnosis:Myelopathy of thoracic region [M47.14]  Alteration in neurological status [R29.90]  Patient Admission Status: Inpatient  Date of Admission:3/1/2024  4:33 PM    Length of Stay: 4 GLOS: GMLOS: 3.2 Readmission Risk Score: Readmission Risk Score: 6.8      ________________________________________________________________________________________  PT AM-PAC: 17 / 24 per last evaluation on: 3/5    OT AM-PAC: 18 / 24 per last evaluation on: 3/5    DME Needs for discharge: walker bariatric, shower chair  DME Ordered: No DME Delivered: No  DME Company: [employer]    ________________________________________________________________________________________  Discharge Plan: Home with Home Health Care: tbd        Transportation PLAN for discharge: family    Tentative discharge date: 3/6    Potential assistance Purchasing Medications:    Does Patient want to participate in local refill/ meds to beds program?:      Current barriers to discharge: Pain    ________________________________________________________________________________________  Case Management Notes:   Patient is from home with spouse, plans to return home with [facility], needs DME prior to discharge.    Richardson and his family were provided with choice of provider; he and his family are in agreement with the discharge plan.    Emergency Contacts:  Extended Emergency Contact Information  Primary Emergency Contact: Tiffanie Vargas  Address: [address] [place], [place] [address] [facility]  Home Phone: [phone]  Mobile Phone: [phone]  Relation: Spouse  Secondary Emergency Contact: Deepa Vargas  Home Phone: [phone]  Relation: Parent    Care Transition Patient: No    IMM Status:      Yamileth Friedman, RN  [facility] 
CM spoke with patient at bedside.  He plans to return home with spouse and is agreeable to [facility], no preference to agency.  Patient received RW, will get shower chair from outside source.      Home Care Agency:  [facility]  Phone: [phone]  Fax: .      Durable Medical Equipment:  DME Provider: [employer]  Equipment obtained during hospitalization: bariatric RW delivered     Patient's family to transport home.    Yamileth Friedman RN, BSN,   5T Ortho/Neuro   [phone]    
CTN contacted Yoanna with [facility]  [phone]. They have accepted this patient and will pull referral from [facility]. They will contact patient and make arrangements for SOC by 3/8  Electronically signed by Ashley Crowell LPN on 3/6/2024 at 2:51 PM    
CTN received call from SANCHO Knox 3/11 that patient states [facility] has been scheduled 2x to see patient, but did not arrrive.     CTN spoke to rep Yoanna with [facility]. She stated that she spoke to the patient everyday since discharge. The first day patient was scheduled to be seen, he did not want anyone to come to home until his insurance cost was clarified. Patient has a commercial plan that many agencies are not in network.   Insurance was clarified and patient was scheduled to be seen by RN Felipe 3/10, but patient did not want her to come out, thinking she would not be able to remove his prevena wound vac, when in fact the nurse was specifically chosen due to her knowledge with prevena.  Patient is currently scheduled to be seen 3/11.  Electronically signed by Ashley Crowell LPN on 3/11/2024 at 2:55 PM    
  Patient expects to discharge to: House  Plan for transportation at discharge: Self    Financial    Payor: AETNA / Plan: AETNA / Product Type: *No Product type* /     Does insurance require precert for SNF: Yes    Potential assistance Purchasing Medications:    Meds-to-Beds request:        [facility] [id number] - [place], [place] - [address] [facility] [address] - P [phone] - F [phone]  [address]  [facility] [address]  Phone: [phone] Fax: [phone]      Notes:    Factors facilitating achievement of predicted outcomes: Cooperative and Pleasant    Barriers to discharge: Medical complications    Additional Case Management Notes:  Patient is from home with spouse, independent pta.  Patient would like [facility] at discharge, does not want to go to rehab.  Patient may have DME needs, pending therapy.    The Plan for Transition of Care is related to the following treatment goals of Myelopathy of thoracic region [M47.14]  Alteration in neurological status [R29.90]    IF APPLICABLE: The Patient and/or patient representative Richardson and his family were provided with a choice of provider and agrees with the discharge plan. Freedom of choice list with basic dialogue that supports the patient's individualized plan of care/goals and shares the quality data associated with the providers was provided to: Patient   Patient Representative Name:       The Patient and/or Patient Representative Agree with the Discharge Plan? Yes    Yamileth Friedman RN  Case Management Department  Ph: [phone] Fax: [phone]

## 2024-03-20 ENCOUNTER — CARE COORDINATION (OUTPATIENT)
Dept: CARE COORDINATION | Age: 54
End: 2024-03-20

## 2024-03-20 NOTE — CARE COORDINATION
CAIT RN received in basket msg from PCP office,   \"  Patient has been In patient for over a week, had spinal surgery and was d/c home. He called because he states he missed a call from our office. TCM Outreach was unable to leave a message because VM full.   He is asking for medication refills of what he was prescribed in the hospital. He states he was rotating valium alternated with methocarbamol.  I instructed him to call [facility] to ask about refills, since he is following up with them Post-op.He does have follow up with surgical group on 3/19/24 [facility]     Offered to schedule hospital follow up, but he has PT scheduled multiple times. He has difficulty with transportation and mobilization. Offered virtual, and he states he could do that, but unsure of his other appointments that may conflict.      Please advise. He would also like a call back from Nurse .       Chart reviewed,   CM spoke with patient at bedside.  He plans to return home with spouse and is agreeable to [facility], no preference to agency.  Patient received RW, will get shower chair from outside source.        Home Care Agency:  [employer]   Phone: [phone]  Fax: .        Durable Medical Equipment:  DME Provider: [facility]  Equipment obtained during hospitalization: bariatric RW delivered      Patient's family to transport home.     Yamileth Friedman RN, BSN,   5T Ortho/Neuro   [phone]    ACM made outreach today and confirmed with pt skilled HC in placed. Richardson confirmed, [facility] gave pt contact information for [facility]. Pt stating that he had OOP expense for previous MRI and wanting PCP to correct codes. ACM directed pt to reach out to payor and then PCP office. Pt V/U no other needs identified at this time.    Lorena SIMMONS, RN  Ambulatory Care Manager  [email or web address]  [phone]

## 2024-04-09 ENCOUNTER — TELEPHONE (OUTPATIENT)
Dept: FAMILY MEDICINE CLINIC | Age: 54
End: 2024-04-09

## 2024-04-09 DIAGNOSIS — G47.30 SLEEP APNEA, UNSPECIFIED TYPE: Primary | ICD-10-CM

## 2024-04-09 NOTE — TELEPHONE ENCOUNTER
Pt called to request a referral for [facility]. States that his bipap machine stopped working. He has not seen soila since 2020 so he needs to re-establish care.

## 2024-04-11 ENCOUNTER — OFFICE VISIT (OUTPATIENT)
Dept: SLEEP MEDICINE | Age: 54
End: 2024-04-11
Payer: COMMERCIAL

## 2024-04-11 ENCOUNTER — TELEPHONE (OUTPATIENT)
Dept: PULMONOLOGY | Age: 54
End: 2024-04-11

## 2024-04-11 VITALS
OXYGEN SATURATION: 96 % | HEIGHT: 70 IN | BODY MASS INDEX: 45.1 KG/M2 | SYSTOLIC BLOOD PRESSURE: 142 MMHG | DIASTOLIC BLOOD PRESSURE: 76 MMHG | HEART RATE: 91 BPM | WEIGHT: 315 LBS

## 2024-04-11 DIAGNOSIS — Z71.89 ENCOUNTER FOR BIPAP USE COUNSELING: ICD-10-CM

## 2024-04-11 DIAGNOSIS — G47.33 SEVERE OBSTRUCTIVE SLEEP APNEA: Primary | ICD-10-CM

## 2024-04-11 DIAGNOSIS — I10 ESSENTIAL HYPERTENSION: ICD-10-CM

## 2024-04-11 DIAGNOSIS — E66.01 OBESITY, MORBID, BMI 40.0-49.9 (HCC): ICD-10-CM

## 2024-04-11 PROCEDURE — 3078F DIAST BP <80 MM HG: CPT | Performed by: NURSE PRACTITIONER

## 2024-04-11 PROCEDURE — 99204 OFFICE O/P NEW MOD 45 MIN: CPT | Performed by: NURSE PRACTITIONER

## 2024-04-11 PROCEDURE — 3077F SYST BP >= 140 MM HG: CPT | Performed by: NURSE PRACTITIONER

## 2024-04-11 ASSESSMENT — SLEEP AND FATIGUE QUESTIONNAIRES
HOW LIKELY ARE YOU TO NOD OFF OR FALL ASLEEP WHILE LYING DOWN TO REST IN THE AFTERNOON WHEN CIRCUMSTANCES PERMIT: HIGH CHANCE OF DOZING
HOW LIKELY ARE YOU TO NOD OFF OR FALL ASLEEP WHILE SITTING QUIETLY AFTER LUNCH WITHOUT ALCOHOL: SLIGHT CHANCE OF DOZING
HOW LIKELY ARE YOU TO NOD OFF OR FALL ASLEEP WHILE WATCHING TV: SLIGHT CHANCE OF DOZING
NECK CIRCUMFERENCE (INCHES): 18
HOW LIKELY ARE YOU TO NOD OFF OR FALL ASLEEP WHILE SITTING AND TALKING TO SOMEONE: WOULD NEVER DOZE
HOW LIKELY ARE YOU TO NOD OFF OR FALL ASLEEP IN A CAR, WHILE STOPPED FOR A FEW MINUTES IN TRAFFIC: WOULD NEVER DOZE
HOW LIKELY ARE YOU TO NOD OFF OR FALL ASLEEP WHILE SITTING INACTIVE IN A PUBLIC PLACE: WOULD NEVER DOZE
HOW LIKELY ARE YOU TO NOD OFF OR FALL ASLEEP WHILE SITTING AND READING: WOULD NEVER DOZE
ESS TOTAL SCORE: 5
HOW LIKELY ARE YOU TO NOD OFF OR FALL ASLEEP WHEN YOU ARE A PASSENGER IN A CAR FOR AN HOUR WITHOUT A BREAK: WOULD NEVER DOZE

## 2024-04-11 NOTE — TELEPHONE ENCOUNTER
Faxed bipap order, nasal pillows mask order, HST, bipap titration, and ov notes to [facility] at [phone] via [employer]ax.
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
## 2024-04-30 RX ORDER — SIMETHICONE 80 MG
TABLET,CHEWABLE ORAL
Qty: 30 TABLET | Refills: 0 | OUTPATIENT
Start: 2024-04-30

## 2024-04-30 RX ORDER — TAMSULOSIN HYDROCHLORIDE 0.4 MG/1
0.4 CAPSULE ORAL DAILY
Qty: 30 CAPSULE | Refills: 1 | OUTPATIENT
Start: 2024-04-30

## 2024-04-30 RX ORDER — CHLORPROMAZINE HYDROCHLORIDE 25 MG/1
TABLET, FILM COATED ORAL
Qty: 15 TABLET | Refills: 0 | OUTPATIENT
Start: 2024-04-30

## 2024-05-06 RX ORDER — TAMSULOSIN HYDROCHLORIDE 0.4 MG/1
0.4 CAPSULE ORAL DAILY
Qty: 30 CAPSULE | Refills: 1 | OUTPATIENT
Start: 2024-05-06

## 2024-05-08 ENCOUNTER — TELEPHONE (OUTPATIENT)
Dept: FAMILY MEDICINE CLINIC | Age: 54
End: 2024-05-08

## 2024-05-08 DIAGNOSIS — I10 ESSENTIAL HYPERTENSION: ICD-10-CM

## 2024-05-08 RX ORDER — METOPROLOL SUCCINATE 100 MG/1
TABLET, EXTENDED RELEASE ORAL
Qty: 90 TABLET | Refills: 1 | Status: SHIPPED | OUTPATIENT
Start: 2024-05-08

## 2024-05-08 RX ORDER — TAMSULOSIN HYDROCHLORIDE 0.4 MG/1
0.4 CAPSULE ORAL DAILY
Qty: 90 CAPSULE | Refills: 1 | Status: SHIPPED | OUTPATIENT
Start: 2024-05-08

## 2024-05-08 NOTE — TELEPHONE ENCOUNTER
Patient contacted the office req a refill on  Metoprolol 100 mg tabs  Last visit 2/1/24  No future  Bob silas      Patient is also req a refill on Flomax , he was getting this from his surgeon but states does not see him anymore, he states it helps with his urination. I states to patient he may need to be seen, please advise [phone]

## 2024-06-13 ENCOUNTER — OFFICE VISIT (OUTPATIENT)
Dept: SLEEP MEDICINE | Age: 54
End: 2024-06-13
Payer: COMMERCIAL

## 2024-06-13 ENCOUNTER — TELEPHONE (OUTPATIENT)
Dept: PULMONOLOGY | Age: 54
End: 2024-06-13

## 2024-06-13 VITALS
DIASTOLIC BLOOD PRESSURE: 68 MMHG | BODY MASS INDEX: 42.66 KG/M2 | WEIGHT: 315 LBS | SYSTOLIC BLOOD PRESSURE: 128 MMHG | OXYGEN SATURATION: 97 % | HEIGHT: 72 IN | HEART RATE: 85 BPM

## 2024-06-13 DIAGNOSIS — Z71.89 CPAP USE COUNSELING: ICD-10-CM

## 2024-06-13 DIAGNOSIS — E66.01 OBESITY, MORBID, BMI 40.0-49.9 (HCC): ICD-10-CM

## 2024-06-13 DIAGNOSIS — G47.33 SEVERE OBSTRUCTIVE SLEEP APNEA: Primary | ICD-10-CM

## 2024-06-13 DIAGNOSIS — I10 ESSENTIAL HYPERTENSION: ICD-10-CM

## 2024-06-13 PROCEDURE — 3074F SYST BP LT 130 MM HG: CPT | Performed by: NURSE PRACTITIONER

## 2024-06-13 PROCEDURE — 3078F DIAST BP <80 MM HG: CPT | Performed by: NURSE PRACTITIONER

## 2024-06-13 PROCEDURE — 99214 OFFICE O/P EST MOD 30 MIN: CPT | Performed by: NURSE PRACTITIONER

## 2024-06-13 RX ORDER — DIAZEPAM 5 MG/1
5 TABLET ORAL
COMMUNITY
Start: 2024-05-28

## 2024-06-13 ASSESSMENT — SLEEP AND FATIGUE QUESTIONNAIRES
HOW LIKELY ARE YOU TO NOD OFF OR FALL ASLEEP WHILE WATCHING TV: WOULD NEVER DOZE
NECK CIRCUMFERENCE (INCHES): 20
HOW LIKELY ARE YOU TO NOD OFF OR FALL ASLEEP WHILE SITTING INACTIVE IN A PUBLIC PLACE: WOULD NEVER DOZE
HOW LIKELY ARE YOU TO NOD OFF OR FALL ASLEEP WHILE LYING DOWN TO REST IN THE AFTERNOON WHEN CIRCUMSTANCES PERMIT: SLIGHT CHANCE OF DOZING
HOW LIKELY ARE YOU TO NOD OFF OR FALL ASLEEP WHILE SITTING AND TALKING TO SOMEONE: WOULD NEVER DOZE
HOW LIKELY ARE YOU TO NOD OFF OR FALL ASLEEP WHEN YOU ARE A PASSENGER IN A CAR FOR AN HOUR WITHOUT A BREAK: WOULD NEVER DOZE
ESS TOTAL SCORE: 1
HOW LIKELY ARE YOU TO NOD OFF OR FALL ASLEEP WHILE SITTING AND READING: WOULD NEVER DOZE
HOW LIKELY ARE YOU TO NOD OFF OR FALL ASLEEP WHILE SITTING QUIETLY AFTER LUNCH WITHOUT ALCOHOL: WOULD NEVER DOZE
HOW LIKELY ARE YOU TO NOD OFF OR FALL ASLEEP IN A CAR, WHILE STOPPED FOR A FEW MINUTES IN TRAFFIC: WOULD NEVER DOZE

## 2024-06-13 NOTE — TELEPHONE ENCOUNTER
Faxed heated tubing order, CR, Initial CR, and ov notes to [facility] at [phone] via RightFax.
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
## 2024-07-29 ENCOUNTER — HOSPITAL ENCOUNTER (OUTPATIENT)
Dept: MRI IMAGING | Age: 54
Discharge: HOME | End: 2024-07-29

## 2024-07-29 DIAGNOSIS — M48.04 THORACIC STENOSIS: ICD-10-CM

## 2024-08-06 ENCOUNTER — TELEPHONE (OUTPATIENT)
Dept: DERMATOLOGY | Age: 54
End: 2024-08-06

## 2024-08-06 NOTE — TELEPHONE ENCOUNTER
Pt last seen 2018. States was told only needed to come back every 5 years. Has spot on nose and also hoping to have routine mole check. States wife, Tiffanie, is current patient of Dr. Dixon.    Could he schedule an appt?    [phone]

## 2024-08-06 NOTE — TELEPHONE ENCOUNTER
In formed pt he would not be able to schedule with Dr. Dixon. I offered to email or give alternate suggestions of offices to try. Pt declined.

## 2024-08-12 ENCOUNTER — HOSPITAL ENCOUNTER (OUTPATIENT)
Dept: MRI IMAGING | Age: 54
Discharge: HOME | End: 2024-08-12
Payer: COMMERCIAL

## 2024-08-12 LAB
PERFORMED ON: NORMAL
POC CREATININE: 0.9 MG/DL (ref 0.9–1.3)
POC SAMPLE TYPE: NORMAL

## 2024-08-12 PROCEDURE — 72157 MRI CHEST SPINE W/O & W/DYE: CPT

## 2024-08-12 PROCEDURE — 82565 ASSAY OF CREATININE: CPT

## 2024-08-12 PROCEDURE — 6360000004 HC RX CONTRAST MEDICATION: Performed by: NEUROLOGICAL SURGERY

## 2024-08-12 PROCEDURE — A9579 GAD-BASE MR CONTRAST NOS,1ML: HCPCS | Performed by: NEUROLOGICAL SURGERY

## 2024-08-12 RX ADMIN — GADOTERIDOL 20 ML: 279.3 INJECTION, SOLUTION INTRAVENOUS at 11:11

## 2024-12-02 ENCOUNTER — HOSPITAL ENCOUNTER (OUTPATIENT)
Dept: PHYSICAL THERAPY | Age: 54
Setting detail: THERAPIES SERIES
Discharge: HOME | End: 2024-12-02

## 2024-12-02 PROCEDURE — 97162 PT EVAL MOD COMPLEX 30 MIN: CPT

## 2024-12-02 NOTE — THERAPY EVALUATION
(TIMED) minutes # CPT Code (UNTIMED) #     Therex (94640)     EVAL:MODERATE (17149 - Typically 30 minutes face-to-face) 1    Neuromusc. Re-ed (22924)    Re-Eval (41947)     Manual (45219)    Estim Unattended (51763)     Ther. Act (57513)    Mech. Traction (11186)     Gait (02625)    Dry Needle 1-2 muscle (34000)     Aquatic Therex (46936)    Dry Needle 3+ muscle (51756)     Iontophoresis (21831)    VASO (52389)     Ultrasound (02310)    Group Therapy (29915)     Estim Attended (07258)    Canalith Repositioning (34545)     Physical Performance Test (76225)    Custom orthotic ()     Other:    Other:    Total Timed Code Tx Minutes    1     Total Treatment Minutes 45        Charge Justification:  N/A - EVAL ONLY    GOALS     Patient stated goal: Patient will walk 1500 steps without increased pain to see his niece graduate [employer] training.  [] Progressing: [] Met: [] Not Met: [] Adjusted    Therapist goals for Patient:   Short Term Goals: To be achieved in: 2 weeks  1. Independent in HEP and progression per patient tolerance, in order to prevent re-injury.   [] Progressing: [] Met: [] Not Met: [] Adjusted  2. Patient will have a decrease in pain to <1/10 to facilitate improvement in movement, function, and ADLs as indicated by Functional Deficits.  [] Progressing: [] Met: [] Not Met: [] Adjusted      Long Term Goals: To be achieved in: 4-6 weeks  1. Disability index score of 15% or less for the Modified Oswestry to assist with reaching prior level of function with activities such as walking without having increased pain.  [] Progressing: [] Met: [] Not Met: [] Adjusted  2. Patient will demonstrate improved gait mechanics in order to walk without restriction.   [] Progressing: [] Met: [] Not Met: [] Adjusted  3. Patient will demonstrate increased Strength of gluteal muscles to demonstrate improved muscle activation/motor control to allow for proper functional mobility to enable patient to return to standing

## 2024-12-04 ENCOUNTER — APPOINTMENT (OUTPATIENT)
Dept: PHYSICAL THERAPY | Age: 54
End: 2024-12-04

## 2024-12-09 ENCOUNTER — APPOINTMENT (OUTPATIENT)
Dept: PHYSICAL THERAPY | Age: 54
End: 2024-12-09

## 2024-12-11 ENCOUNTER — APPOINTMENT (OUTPATIENT)
Dept: PHYSICAL THERAPY | Age: 54
End: 2024-12-11

## 2024-12-18 ENCOUNTER — APPOINTMENT (OUTPATIENT)
Dept: PHYSICAL THERAPY | Age: 54
End: 2024-12-18

## 2024-12-23 ENCOUNTER — APPOINTMENT (OUTPATIENT)
Dept: PHYSICAL THERAPY | Age: 54
End: 2024-12-23

## 2024-12-26 ENCOUNTER — APPOINTMENT (OUTPATIENT)
Dept: PHYSICAL THERAPY | Age: 54
End: 2024-12-26

## 2024-12-30 ENCOUNTER — APPOINTMENT (OUTPATIENT)
Dept: PHYSICAL THERAPY | Age: 54
End: 2024-12-30

## 2025-05-05 ENCOUNTER — TELEPHONE (OUTPATIENT)
Dept: PULMONOLOGY | Age: 55
End: 2025-05-05

## 2025-05-05 NOTE — TELEPHONE ENCOUNTER
Received a letter from insurance regarding an appeal. No appeal has been issued by our office. Need to make sure nothing else is needed.

## 2025-05-14 NOTE — TELEPHONE ENCOUNTER
Per edward leigh, patient's insurance states pt has met his OOP costs and no further action is needed.

## 2025-05-28 ENCOUNTER — OFFICE VISIT (OUTPATIENT)
Dept: FAMILY MEDICINE CLINIC | Age: 55
End: 2025-05-28
Payer: COMMERCIAL

## 2025-05-28 VITALS
SYSTOLIC BLOOD PRESSURE: 132 MMHG | OXYGEN SATURATION: 97 % | WEIGHT: 315 LBS | BODY MASS INDEX: 43.26 KG/M2 | HEART RATE: 72 BPM | DIASTOLIC BLOOD PRESSURE: 82 MMHG

## 2025-05-28 DIAGNOSIS — F40.240 CLAUSTROPHOBIA: ICD-10-CM

## 2025-05-28 DIAGNOSIS — Z00.00 WELL ADULT EXAM: Primary | ICD-10-CM

## 2025-05-28 DIAGNOSIS — K21.9 GASTROESOPHAGEAL REFLUX DISEASE WITHOUT ESOPHAGITIS: ICD-10-CM

## 2025-05-28 DIAGNOSIS — F40.243 ANXIETY WITH FLYING: ICD-10-CM

## 2025-05-28 DIAGNOSIS — I10 ESSENTIAL HYPERTENSION: ICD-10-CM

## 2025-05-28 DIAGNOSIS — Z00.00 WELL ADULT EXAM: ICD-10-CM

## 2025-05-28 DIAGNOSIS — G47.30 SLEEP APNEA, UNSPECIFIED TYPE: ICD-10-CM

## 2025-05-28 DIAGNOSIS — T75.3XXA MOTION SICKNESS, INITIAL ENCOUNTER: ICD-10-CM

## 2025-05-28 LAB
ALBUMIN SERPL-MCNC: 4.4 G/DL (ref 3.4–5)
ALBUMIN/GLOB SERPL: 1.8 {RATIO} (ref 1.1–2.2)
ALP SERPL-CCNC: 114 U/L (ref 40–129)
ALT SERPL-CCNC: 45 U/L (ref 10–40)
ANION GAP SERPL CALCULATED.3IONS-SCNC: 10 MMOL/L (ref 3–16)
AST SERPL-CCNC: 29 U/L (ref 15–37)
BILIRUB SERPL-MCNC: 0.5 MG/DL (ref 0–1)
BUN SERPL-MCNC: 10 MG/DL (ref 7–20)
CALCIUM SERPL-MCNC: 9.6 MG/DL (ref 8.3–10.6)
CHLORIDE SERPL-SCNC: 103 MMOL/L (ref 99–110)
CHOLEST SERPL-MCNC: 190 MG/DL (ref 0–199)
CO2 SERPL-SCNC: 26 MMOL/L (ref 21–32)
CREAT SERPL-MCNC: 1 MG/DL (ref 0.9–1.3)
EST. AVERAGE GLUCOSE BLD GHB EST-MCNC: 119.8 MG/DL
GFR SERPLBLD CREATININE-BSD FMLA CKD-EPI: 90 ML/MIN/{1.73_M2}
GLUCOSE SERPL-MCNC: 112 MG/DL (ref 70–99)
HBA1C MFR BLD: 5.8 %
HDLC SERPL-MCNC: 33 MG/DL (ref 40–60)
LDL CHOLESTEROL: 127 MG/DL
POTASSIUM SERPL-SCNC: 4.5 MMOL/L (ref 3.5–5.1)
PROT SERPL-MCNC: 6.9 G/DL (ref 6.4–8.2)
PSA SERPL DL<=0.01 NG/ML-MCNC: 1.65 NG/ML (ref 0–4)
SODIUM SERPL-SCNC: 139 MMOL/L (ref 136–145)
TRIGL SERPL-MCNC: 150 MG/DL (ref 0–150)
VLDLC SERPL CALC-MCNC: 30 MG/DL

## 2025-05-28 PROCEDURE — 3075F SYST BP GE 130 - 139MM HG: CPT | Performed by: STUDENT IN AN ORGANIZED HEALTH CARE EDUCATION/TRAINING PROGRAM

## 2025-05-28 PROCEDURE — 90471 IMMUNIZATION ADMIN: CPT | Performed by: STUDENT IN AN ORGANIZED HEALTH CARE EDUCATION/TRAINING PROGRAM

## 2025-05-28 PROCEDURE — 90715 TDAP VACCINE 7 YRS/> IM: CPT | Performed by: STUDENT IN AN ORGANIZED HEALTH CARE EDUCATION/TRAINING PROGRAM

## 2025-05-28 PROCEDURE — 99214 OFFICE O/P EST MOD 30 MIN: CPT | Performed by: STUDENT IN AN ORGANIZED HEALTH CARE EDUCATION/TRAINING PROGRAM

## 2025-05-28 PROCEDURE — 3079F DIAST BP 80-89 MM HG: CPT | Performed by: STUDENT IN AN ORGANIZED HEALTH CARE EDUCATION/TRAINING PROGRAM

## 2025-05-28 PROCEDURE — 99396 PREV VISIT EST AGE 40-64: CPT | Performed by: STUDENT IN AN ORGANIZED HEALTH CARE EDUCATION/TRAINING PROGRAM

## 2025-05-28 RX ORDER — DIAZEPAM 5 MG/1
5 TABLET ORAL EVERY 12 HOURS PRN
Qty: 12 TABLET | Refills: 0 | Status: SHIPPED | OUTPATIENT
Start: 2025-05-28 | End: 2025-06-09

## 2025-05-28 RX ORDER — SCOPOLAMINE 1 MG/3D
1 PATCH, EXTENDED RELEASE TRANSDERMAL
Qty: 5 PATCH | Refills: 0 | Status: SHIPPED | OUTPATIENT
Start: 2025-05-28

## 2025-05-28 SDOH — ECONOMIC STABILITY: FOOD INSECURITY: WITHIN THE PAST 12 MONTHS, YOU WORRIED THAT YOUR FOOD WOULD RUN OUT BEFORE YOU GOT MONEY TO BUY MORE.: NEVER TRUE

## 2025-05-28 SDOH — ECONOMIC STABILITY: FOOD INSECURITY: WITHIN THE PAST 12 MONTHS, THE FOOD YOU BOUGHT JUST DIDN'T LAST AND YOU DIDN'T HAVE MONEY TO GET MORE.: NEVER TRUE

## 2025-05-28 ASSESSMENT — PATIENT HEALTH QUESTIONNAIRE - PHQ9
SUM OF ALL RESPONSES TO PHQ QUESTIONS 1-9: 0
1. LITTLE INTEREST OR PLEASURE IN DOING THINGS: NOT AT ALL
SUM OF ALL RESPONSES TO PHQ QUESTIONS 1-9: 0
2. FEELING DOWN, DEPRESSED OR HOPELESS: NOT AT ALL

## 2025-05-28 NOTE — PROGRESS NOTES
[facility] Medicine  Well Adult Visit  2025    Richardson Vargas (:  1970) is a 54 y.o. male, here to establish care.    Chief Complaint   Patient presents with    Medication Refill     Would like to get something for nausea going on a trip and would like labs done    Annual Exam        ASSESSMENT/ PLAN  1. Well adult exam  General wellness exam. Reviewed chart for past hx and updated today. Counseled on age appropriate health guidance and discussed screening recommendations. Vaccinations reviewed and discussed. All questions answered.  - PSA Screening; Future  - Comprehensive Metabolic Panel; Future  - Lipid, Fasting; Future  - Hemoglobin A1C; Future    2. Gastroesophageal reflux disease without esophagitis  Chronic.  Stable.  Diet controlled.  Not a medication.  Will hold off on medication at this time.    3. Essential hypertension  Chronic.  Stable.  Diet controlled.  Not on medication we will.  Will hold off on medication at this time.    4. Sleep apnea, unspecified type  Chronic.  Stable.  On CPAP.  No concerns.    5. Anxiety with flying  This is a chronic problem.  Needs medications whenever he flies.  Will prescribe diazepam at this time  - diazePAM (VALIUM) 5 MG tablet; Take 1 tablet by mouth every 12 hours as needed for Anxiety for up to 12 days. Max Daily Amount: 10 mg  Dispense: 12 tablet; Refill: 0    6. Claustrophobia  Patient staying on a cruise ship in close quarters.  Similar to his anxiety with flying, patient needs medications to deal with claustrophobia.  Will prescribe diazepam at this time.  - diazePAM (VALIUM) 5 MG tablet; Take 1 tablet by mouth every 12 hours as needed for Anxiety for up to 12 days. Max Daily Amount: 10 mg  Dispense: 12 tablet; Refill: 0    7. Motion sickness, initial encounter  - scopolamine (TRANSDERM-SCOP) transdermal patch; Place 1 patch onto the skin every 72 hours  Dispense: 5 patch; Refill: 0       No follow-ups on file.    HPI    Patient is a

## 2025-05-29 ENCOUNTER — OFFICE VISIT (OUTPATIENT)
Dept: SLEEP MEDICINE | Age: 55
End: 2025-05-29
Payer: COMMERCIAL

## 2025-05-29 ENCOUNTER — RESULTS FOLLOW-UP (OUTPATIENT)
Dept: FAMILY MEDICINE CLINIC | Age: 55
End: 2025-05-29

## 2025-05-29 VITALS
HEART RATE: 86 BPM | WEIGHT: 315 LBS | OXYGEN SATURATION: 97 % | HEIGHT: 72 IN | DIASTOLIC BLOOD PRESSURE: 82 MMHG | BODY MASS INDEX: 42.66 KG/M2 | SYSTOLIC BLOOD PRESSURE: 140 MMHG

## 2025-05-29 DIAGNOSIS — I10 ESSENTIAL HYPERTENSION: ICD-10-CM

## 2025-05-29 DIAGNOSIS — E66.01 OBESITY, MORBID, BMI 40.0-49.9 (HCC): ICD-10-CM

## 2025-05-29 DIAGNOSIS — Z71.89 ENCOUNTER FOR BIPAP USE COUNSELING: ICD-10-CM

## 2025-05-29 DIAGNOSIS — Z71.89 CPAP USE COUNSELING: ICD-10-CM

## 2025-05-29 DIAGNOSIS — G47.33 SEVERE OBSTRUCTIVE SLEEP APNEA: Primary | ICD-10-CM

## 2025-05-29 PROCEDURE — 99214 OFFICE O/P EST MOD 30 MIN: CPT | Performed by: NURSE PRACTITIONER

## 2025-05-29 PROCEDURE — 3079F DIAST BP 80-89 MM HG: CPT | Performed by: NURSE PRACTITIONER

## 2025-05-29 PROCEDURE — 3077F SYST BP >= 140 MM HG: CPT | Performed by: NURSE PRACTITIONER

## 2025-05-29 ASSESSMENT — SLEEP AND FATIGUE QUESTIONNAIRES
HOW LIKELY ARE YOU TO NOD OFF OR FALL ASLEEP WHILE SITTING AND READING: WOULD NEVER DOZE
HOW LIKELY ARE YOU TO NOD OFF OR FALL ASLEEP WHILE WATCHING TV: WOULD NEVER DOZE
HOW LIKELY ARE YOU TO NOD OFF OR FALL ASLEEP WHILE SITTING INACTIVE IN A PUBLIC PLACE: WOULD NEVER DOZE
HOW LIKELY ARE YOU TO NOD OFF OR FALL ASLEEP IN A CAR, WHILE STOPPED FOR A FEW MINUTES IN TRAFFIC: WOULD NEVER DOZE
HOW LIKELY ARE YOU TO NOD OFF OR FALL ASLEEP WHEN YOU ARE A PASSENGER IN A CAR FOR AN HOUR WITHOUT A BREAK: WOULD NEVER DOZE
HOW LIKELY ARE YOU TO NOD OFF OR FALL ASLEEP WHILE SITTING AND TALKING TO SOMEONE: WOULD NEVER DOZE
ESS TOTAL SCORE: 1
HOW LIKELY ARE YOU TO NOD OFF OR FALL ASLEEP WHILE LYING DOWN TO REST IN THE AFTERNOON WHEN CIRCUMSTANCES PERMIT: SLIGHT CHANCE OF DOZING
HOW LIKELY ARE YOU TO NOD OFF OR FALL ASLEEP WHILE SITTING QUIETLY AFTER LUNCH WITHOUT ALCOHOL: WOULD NEVER DOZE

## 2025-05-29 NOTE — PROGRESS NOTES
Patient ID: Richardson Vargas is a 54 y.o. male who is being seen today for   Chief Complaint   Patient presents with    Follow-up    Sleep Apnea     Has machine directly from [facility] not set          HPI:   Richardson Vargas is a 54 y.o. male in office for BERNADINE follow up.  States he got a new BIPAP online.  States He has changed insurance multiple times since getting new BIPAP DME steer.  States he keeps getting rebilled so just order new BIPAP online.  He is working with his insurance and the DME company regarding this and hopes to keep his current [facility] air curve 10 and continue to use it however he has new air curve 11 if he needs it.  States he is doing well with BIPAP.  Patient is using BiPAP 9 hrs/night.  Not using humidifier. No snoring on BiPAP. The pressure is well tolerated. The mask is comfortable- nasal cushion. Minimal mask leak. No significant daytime sleepiness. No nodding off when driving. No dry nose or throat. No fatigue. Bedtime is 1030-11 pm and rise time is 10 am. Sleep onset is few minutes. Wakes up 0-1 times at night total. 0-1 nocturia. It takes usually few minutes to fall back a sleep. Rare naps during the day. No headache in am. No weight gain. 0-1 caffienated beverages during the day. No alcohol. ESS is 1             5/29/2025     1:14 PM 6/13/2024    11:21 AM 4/11/2024    10:53 AM 1/28/2020    11:35 AM 1/29/2019    11:15 AM 8/21/2018    10:19 AM   Sleep Medicine   Sitting and reading 0 0 0 0 0 1   Watching TV 0 0 1 0 0 1   Sitting, inactive in a public place (e.g. a theatre or a meeting) 0 0 0 0 0 0   As a passenger in a car for an hour without a break 0 0 0 0 0 0   Lying down to rest in the afternoon when circumstances permit 1 1 3 0 0 3   Sitting and talking to someone 0 0 0 0 0 0   Sitting quietly after a lunch without alcohol 0 0 1 0 0 0   In a car, while stopped for a few minutes in traffic 0 0 0 0 0 0   [place] Sleepiness Score 1 1 5 0 0 5   Neck (Inches) 20 20 18 19 19.5 19.5

## (undated) DEVICE — PROBE 8225101 5PK STD PRASS FL TIP ROHS

## (undated) DEVICE — COVER LT HNDL CAM BLU DISP W/ SURG CTRL

## (undated) DEVICE — BLADE ES L4IN INSUL EDGE

## (undated) DEVICE — STAPLER SKIN H3.9MM WIRE DIA0.58MM CRWN 6.9MM 35 STPL ROT

## (undated) DEVICE — SUTURE VCRL + SZ 0 L18IN ABSRB UD L36MM CT-1 1/2 CIR VCP840D

## (undated) DEVICE — SUTURE VCRL + SZ 3-0 L18IN ABSRB UD SH 1/2 CIR TAPERCUT NDL VCP864D

## (undated) DEVICE — SUTURE MCRYL + SZ 4-0 L27IN ABSRB UD L19MM PS-2 3/8 CIR MCP426H

## (undated) DEVICE — ORTHO PRE OP PACK: Brand: MEDLINE INDUSTRIES, INC.

## (undated) DEVICE — EYE PROTECTOR FOAM MEDICHOICE

## (undated) DEVICE — TOWEL,STOP FLAG GOLD N-W: Brand: MEDLINE

## (undated) DEVICE — PREVENA INCISION MANAGEMENT SYSTEM- PEEL & PLACE DRESSING: Brand: PREVENA™ PEEL & PLACE™

## (undated) DEVICE — KIT EVAC 0.13IN RECT TB DIA10FR 400CC PVC 3 SPR Y CONN DRN

## (undated) DEVICE — BLADE ES L6IN ELASTOMERIC COAT INSUL DURABLE BEND UPTO

## (undated) DEVICE — GLOVE SURG SZ 7.5 L11.2IN THK9.8MIL STRW LTX POLYMER BEAD

## (undated) DEVICE — 3M™ TEGADERM™ CHG CHLORHEXIDINE GLUCONATE GEL PAD 1664, 25 EACH/CARTON, 4 CARTONS/CASE: Brand: 3M™ TEGADERM™

## (undated) DEVICE — UNDERGLOVE SURG SZ 8 BLU LTX FREE SYN POLYISOPRENE POLYMER

## (undated) DEVICE — DRESSING WND VAC MED GRANUFOAM SENSATRAC

## (undated) DEVICE — PAD,NON-ADHERENT,3X8,STERILE,LF,1/PK: Brand: MEDLINE

## (undated) DEVICE — GLOVE SURG SZ 75 L12IN FNGR THK94MIL TRNSLUC YEL LTX

## (undated) DEVICE — CANISTER NEG PRSS 1000ML W/ GEL INFOVAC

## (undated) DEVICE — SPONGE,NEURO,0.5"X3",XR,STRL,LF,10/PK: Brand: MEDLINE

## (undated) DEVICE — ELECTROSURGICAL PENCIL ROCKER SWITCH NON COATED BLADE ELECTRODE 10 FT (3 M) CORD HOLSTER: Brand: MEGADYNE

## (undated) DEVICE — GLOVE SURG SZ 65 L12IN FNGR THK79MIL GRN LTX FREE

## (undated) DEVICE — BASIC SINGLE BASIN 1-LF: Brand: MEDLINE INDUSTRIES, INC.

## (undated) DEVICE — TOOL MR8-14MH30 MR8 14CM MATCH 3MM: Brand: MIDAS REX MR8

## (undated) DEVICE — LAMINECTOMY: Brand: MEDLINE INDUSTRIES, INC.